# Patient Record
Sex: FEMALE | HISPANIC OR LATINO | Employment: FULL TIME | URBAN - METROPOLITAN AREA
[De-identification: names, ages, dates, MRNs, and addresses within clinical notes are randomized per-mention and may not be internally consistent; named-entity substitution may affect disease eponyms.]

---

## 2017-02-06 ENCOUNTER — PATIENT MESSAGE (OUTPATIENT)
Dept: MEDICAL GROUP | Facility: CLINIC | Age: 37
End: 2017-02-06

## 2017-02-06 ENCOUNTER — HOSPITAL ENCOUNTER (OUTPATIENT)
Facility: MEDICAL CENTER | Age: 37
End: 2017-02-06
Attending: NURSE PRACTITIONER
Payer: COMMERCIAL

## 2017-02-06 ENCOUNTER — OFFICE VISIT (OUTPATIENT)
Dept: MEDICAL GROUP | Facility: CLINIC | Age: 37
End: 2017-02-06
Payer: COMMERCIAL

## 2017-02-06 ENCOUNTER — HOSPITAL ENCOUNTER (OUTPATIENT)
Dept: LAB | Facility: MEDICAL CENTER | Age: 37
End: 2017-02-06
Attending: NURSE PRACTITIONER
Payer: COMMERCIAL

## 2017-02-06 VITALS
WEIGHT: 146 LBS | TEMPERATURE: 99.5 F | HEIGHT: 67 IN | BODY MASS INDEX: 22.91 KG/M2 | HEART RATE: 68 BPM | RESPIRATION RATE: 16 BRPM | DIASTOLIC BLOOD PRESSURE: 62 MMHG | OXYGEN SATURATION: 98 % | SYSTOLIC BLOOD PRESSURE: 102 MMHG

## 2017-02-06 DIAGNOSIS — R10.2 PELVIC PAIN: ICD-10-CM

## 2017-02-06 DIAGNOSIS — F43.9 STRESS AT HOME: ICD-10-CM

## 2017-02-06 DIAGNOSIS — R51.9 NONINTRACTABLE HEADACHE, UNSPECIFIED CHRONICITY PATTERN, UNSPECIFIED HEADACHE TYPE: ICD-10-CM

## 2017-02-06 DIAGNOSIS — R61 DIAPHORESIS: ICD-10-CM

## 2017-02-06 DIAGNOSIS — R53.82 CHRONIC FATIGUE: ICD-10-CM

## 2017-02-06 DIAGNOSIS — R53.83 FATIGUE, UNSPECIFIED TYPE: ICD-10-CM

## 2017-02-06 DIAGNOSIS — N95.1 MENOPAUSAL SYMPTOMS: ICD-10-CM

## 2017-02-06 LAB
APPEARANCE UR: NEGATIVE
BILIRUB UR STRIP-MCNC: NEGATIVE MG/DL
COLOR UR AUTO: NEGATIVE
GLUCOSE UR STRIP.AUTO-MCNC: NEGATIVE MG/DL
INT CON NEG: NEGATIVE
INT CON POS: POSITIVE
KETONES UR STRIP.AUTO-MCNC: NEGATIVE MG/DL
LEUKOCYTE ESTERASE UR QL STRIP.AUTO: NEGATIVE
NITRITE UR QL STRIP.AUTO: NEGATIVE
PH UR STRIP.AUTO: 8 [PH] (ref 5–8)
POC URINE PREGNANCY TEST: NEGATIVE
PROT UR QL STRIP: NEGATIVE MG/DL
RBC UR QL AUTO: NEGATIVE
SP GR UR STRIP.AUTO: 1
TSH SERPL DL<=0.005 MIU/L-ACNC: 1.07 UIU/ML (ref 0.3–3.7)
UROBILINOGEN UR STRIP-MCNC: NEGATIVE MG/DL

## 2017-02-06 PROCEDURE — 81002 URINALYSIS NONAUTO W/O SCOPE: CPT | Performed by: NURSE PRACTITIONER

## 2017-02-06 PROCEDURE — 36415 COLL VENOUS BLD VENIPUNCTURE: CPT

## 2017-02-06 PROCEDURE — 99214 OFFICE O/P EST MOD 30 MIN: CPT | Performed by: NURSE PRACTITIONER

## 2017-02-06 PROCEDURE — 84443 ASSAY THYROID STIM HORMONE: CPT

## 2017-02-06 PROCEDURE — 87086 URINE CULTURE/COLONY COUNT: CPT

## 2017-02-06 ASSESSMENT — PAIN SCALES - GENERAL: PAINLEVEL: 6=MODERATE PAIN

## 2017-02-07 NOTE — PROGRESS NOTES
CC: Faint        HPI:     Malaika presents today for the followin. Pelvic pain  Patient tells me that today she started having some midline pelvic pain. Was associated with a small amount of brown vaginal discharge. Denies dysuria or any urinary symptoms. Denies risk for pregnancy. She's not sexually active. Is using the NuvaRing. States yesterday after the beginning of pelvic pain she placed her new routinely scheduled NuvaRing in her vagina. Symptoms have not worsened if anything maybe mildly improved. Last menstrual period started  and resolved around on .  Pelvic pain is not associated with any changes in bowels. No increasing gas.    2. Fatigue, unspecified type/Nonintractable headache, unspecified chronicity pattern, unspecified headache type/Stress at home  Patient tells me yesterday she had an overall sensation of fatigue. She had a headache that began at that time. She states it started between the eyes and on the forehead in the midline area-this is improved since yesterday. She's had some associated nausea. No vomiting. Able to eat and hold down fluids. Initially denies any history of migraines however we did discuss that she does have a history of some list in her chart. She tells me doesn't feel like her previous migraines.  She tells me she does feel lightheaded however has not fainted. She states she feels very overwhelmed she is going to some court proceedings and divorce issues. Twins are around one year of age-both segment 6 on an off for over a month. Neither sleeping well. Get her up at night. Patient is tearful talking about stress issues.  She also states that he unknown if this is related to her not she states she wakes up at night with her legs being wet and sweaty.      She did contact her primary care earlier today and has a new set of labs ordered related to this.    Current Outpatient Prescriptions   Medication Sig Dispense Refill   • escitalopram (LEXAPRO) 20 MG tablet Take 1  "Tab by mouth every day. 30 Tab 6   • hydrocodone-ibuprofen (VICOPROFEN) 7.5-200 MG per tablet Take 1 Tab by mouth every 6 hours as needed for Moderate Pain or Severe Pain (thoracic neuritis and thoracic spine pain). 120 Tab 0   • gabapentin (NEURONTIN) 300 MG Cap Take 1 Cap by mouth 3 times a day. 90 Cap 6   • acyclovir (ZOVIRAX) 400 MG tablet Take 1 Tab by mouth 3 times a day. 25 Tab 6   • NUVARING 0.12-0.015 MG/24HR vaginal ring      • Prenatal MV-Min-Fe Fum-FA-DHA (PRENATAL 1 PO) Take 1 Tab by mouth every day.     • ferrous gluconate (FERGON) 324 (38 FE) MG Tab Take 324 mg by mouth every day.     • calcium citrate (CALCITRATE) 950 MG Tab Take 650 mg by mouth every day.       No current facility-administered medications for this visit.     Social History   Substance Use Topics   • Smoking status: Never Smoker    • Smokeless tobacco: Never Used   • Alcohol Use: No     I reviewed patients allergies, problem list and medications today in [facility].    ROS: Any/all pertinent positives listed in the HPI, otherwise all others reviewed are negative today.      /62 mmHg  Pulse 68  Temp(Src) 37.5 °C (99.5 °F)  Resp 16  Ht 1.702 m (5' 7.01\")  Wt 66.225 kg (146 lb)  BMI 22.86 kg/m2  SpO2 98%  LMP 01/28/2017  Breastfeeding? No    Exam:    Gen: Alert and oriented, No apparent distress. WDWN  Psych: A+Ox3, normal affect and mood  Skin: Warm, dry and intact. Good turgor   No rashes in visible areas.  Eye: Conjunctiva clear, lids normal  ENMT: Lips without lesions, good dentition   Oropharynx clear.   Neck: No Lymphadenopathy, Thyromegaly, Bruits.   Trachea midline, no masses  Lungs: Clear to auscultation bilaterally, no rales or rhonchi   Unlabored respiratory effort.   CV: Regular rate and rhythm, S1, S2. No murmurs.   No Edema  No CVA tenderness  Ext: No clubbing, cyanosis, edema.   Neuro:    CNs: II:PERRLA, III/IV/VI EOM without ptosis or nystagmus, V motor intact, VII facial movements without asymmetry or weakness, " iX/X palate midline, XI Neck strength intact, XII tongue protuded midline.  Motor: Strength noted 5/5 upper and lower bilateral extremities with normal tone.  No noted atrophy or deformity of motion.  Reflexes: Bilaterally symmetrical, relflexes 2+/4 patellar, radial and achilles.  Cerebellar signs: Absent  Tremors : Absent  Office urinalysis negative for leukocytes, nitrates, blood and pregnancy    Assessment and Plan.   37 y.o. female with the following issues.    1. Pelvic pain  Stable. Pelvic ultrasound is ordered however symptoms continued to improve now that she better NuvaRing back in discussion can cancel this. I did send urine cultures to verify no infection  - POCT Urinalysis  - POC URINE PREGNANCY  - URINE CULTURE(NEW); Future  - US-GYN-PELVIS TRANSVAGINAL; Future    2. Fatigue, unspecified type/Nonintractable headache, unspecified chronicity pattern, unspecified headache type/Stress at home  We'll add a thyroid lab. She's influenza negative. Possibly some recent symptoms could be related to a resolving viral syndrome. 2 suspect a lot of could be related high levels of stress and not sleeping well related to dealing with sick 1-year-old twins. We did discuss self-care. She can work on sleeping as much as she can. She has a lot of pending labs currently. We discussed making sure she gets enough fluids, small routine meals. Etc. We discussed her exams normal today  - TSH WITH REFLEX TO FT4; Future  - POCT Influenza A/B

## 2017-02-08 LAB
BACTERIA UR CULT: NORMAL
SIGNIFICANT IND 70042: NORMAL
SOURCE SOURCE: NORMAL

## 2017-02-14 ENCOUNTER — HOSPITAL ENCOUNTER (OUTPATIENT)
Dept: LAB | Facility: MEDICAL CENTER | Age: 37
End: 2017-02-14
Attending: FAMILY MEDICINE
Payer: COMMERCIAL

## 2017-02-14 ENCOUNTER — OFFICE VISIT (OUTPATIENT)
Dept: MEDICAL GROUP | Facility: CLINIC | Age: 37
End: 2017-02-14
Payer: COMMERCIAL

## 2017-02-14 VITALS
TEMPERATURE: 98.2 F | SYSTOLIC BLOOD PRESSURE: 120 MMHG | DIASTOLIC BLOOD PRESSURE: 60 MMHG | RESPIRATION RATE: 16 BRPM | BODY MASS INDEX: 23.23 KG/M2 | HEART RATE: 60 BPM | WEIGHT: 148 LBS | HEIGHT: 67 IN | OXYGEN SATURATION: 98 %

## 2017-02-14 DIAGNOSIS — M54.6 THORACIC SPINE PAIN: ICD-10-CM

## 2017-02-14 DIAGNOSIS — R61 DIAPHORESIS: ICD-10-CM

## 2017-02-14 DIAGNOSIS — R53.82 CHRONIC FATIGUE: ICD-10-CM

## 2017-02-14 DIAGNOSIS — N95.1 MENOPAUSAL SYMPTOMS: ICD-10-CM

## 2017-02-14 DIAGNOSIS — M54.14 THORACIC NEURITIS: ICD-10-CM

## 2017-02-14 DIAGNOSIS — G89.4 CHRONIC PAIN SYNDROME: ICD-10-CM

## 2017-02-14 DIAGNOSIS — G43.709 CHRONIC MIGRAINE WITHOUT AURA WITHOUT STATUS MIGRAINOSUS, NOT INTRACTABLE: ICD-10-CM

## 2017-02-14 LAB
ALBUMIN SERPL BCP-MCNC: 3.8 G/DL (ref 3.2–4.9)
ALP SERPL-CCNC: 42 U/L (ref 30–99)
ALT SERPL-CCNC: 18 U/L (ref 2–50)
ANION GAP SERPL CALC-SCNC: 5 MMOL/L (ref 0–11.9)
AST SERPL-CCNC: 16 U/L (ref 12–45)
BASOPHILS # BLD AUTO: 0.05 K/UL (ref 0–0.12)
BASOPHILS NFR BLD AUTO: 0.9 % (ref 0–1.8)
BILIRUB CONJ SERPL-MCNC: <0.1 MG/DL (ref 0.1–0.5)
BILIRUB INDIRECT SERPL-MCNC: NORMAL MG/DL (ref 0–1)
BILIRUB SERPL-MCNC: 0.3 MG/DL (ref 0.1–1.5)
BUN SERPL-MCNC: 16 MG/DL (ref 8–22)
CALCIUM SERPL-MCNC: 8.8 MG/DL (ref 8.5–10.5)
CHLORIDE SERPL-SCNC: 106 MMOL/L (ref 96–112)
CO2 SERPL-SCNC: 25 MMOL/L (ref 20–33)
CREAT SERPL-MCNC: 0.81 MG/DL (ref 0.5–1.4)
EOSINOPHIL # BLD: 0.04 K/UL (ref 0–0.51)
EOSINOPHIL NFR BLD AUTO: 0.7 % (ref 0–6.9)
ERYTHROCYTE [DISTWIDTH] IN BLOOD BY AUTOMATED COUNT: 42.6 FL (ref 35.9–50)
ESTRADIOL SERPL-MCNC: 772 PG/ML
FSH SERPL-ACNC: 1.4 MIU/ML
GLUCOSE SERPL-MCNC: 75 MG/DL (ref 65–99)
HCT VFR BLD AUTO: 40 % (ref 37–47)
HGB BLD-MCNC: 13.4 G/DL (ref 12–16)
IMM GRANULOCYTES # BLD AUTO: 0 K/UL (ref 0–0.11)
IMM GRANULOCYTES NFR BLD AUTO: 0 % (ref 0–0.9)
IRON SATN MFR SERPL: 33 % (ref 15–55)
IRON SERPL-MCNC: 128 UG/DL (ref 40–170)
LH SERPL-ACNC: 1 IU/L
LYMPHOCYTES # BLD: 1.94 K/UL (ref 1–4.8)
LYMPHOCYTES NFR BLD AUTO: 35 % (ref 22–41)
MCH RBC QN AUTO: 31.2 PG (ref 27–33)
MCHC RBC AUTO-ENTMCNC: 33.5 G/DL (ref 33.6–35)
MCV RBC AUTO: 93 FL (ref 81.4–97.8)
MONOCYTES # BLD: 0.34 K/UL (ref 0–0.85)
MONOCYTES NFR BLD AUTO: 6.1 % (ref 0–13.4)
NEUTROPHILS # BLD: 3.17 K/UL (ref 2–7.15)
NEUTROPHILS NFR BLD AUTO: 57.3 % (ref 44–72)
NRBC # BLD AUTO: 0 K/UL
NRBC BLD-RTO: 0 /100 WBC
PLATELET # BLD AUTO: 201 K/UL (ref 164–446)
PMV BLD AUTO: 11 FL (ref 9–12.9)
POTASSIUM SERPL-SCNC: 4 MMOL/L (ref 3.6–5.5)
PROT SERPL-MCNC: 6.7 G/DL (ref 6–8.2)
RBC # BLD AUTO: 4.3 M/UL (ref 4.2–5.4)
SODIUM SERPL-SCNC: 136 MMOL/L (ref 135–145)
TIBC SERPL-MCNC: 392 UG/DL (ref 250–450)
VIT B12 SERPL-MCNC: 431 PG/ML (ref 211–911)
WBC # BLD AUTO: 5.5 K/UL (ref 4.8–10.8)

## 2017-02-14 PROCEDURE — 83540 ASSAY OF IRON: CPT

## 2017-02-14 PROCEDURE — 83002 ASSAY OF GONADOTROPIN (LH): CPT

## 2017-02-14 PROCEDURE — 83550 IRON BINDING TEST: CPT

## 2017-02-14 PROCEDURE — 85025 COMPLETE CBC W/AUTO DIFF WBC: CPT

## 2017-02-14 PROCEDURE — 83001 ASSAY OF GONADOTROPIN (FSH): CPT

## 2017-02-14 PROCEDURE — 80076 HEPATIC FUNCTION PANEL: CPT

## 2017-02-14 PROCEDURE — 99213 OFFICE O/P EST LOW 20 MIN: CPT | Performed by: FAMILY MEDICINE

## 2017-02-14 PROCEDURE — 82607 VITAMIN B-12: CPT

## 2017-02-14 PROCEDURE — 82670 ASSAY OF TOTAL ESTRADIOL: CPT

## 2017-02-14 PROCEDURE — 80048 BASIC METABOLIC PNL TOTAL CA: CPT

## 2017-02-14 PROCEDURE — 36415 COLL VENOUS BLD VENIPUNCTURE: CPT

## 2017-02-14 RX ORDER — GABAPENTIN 300 MG/1
300 CAPSULE ORAL 3 TIMES DAILY
Qty: 90 CAP | Refills: 6 | Status: SHIPPED | OUTPATIENT
Start: 2017-02-14 | End: 2017-06-22 | Stop reason: SDUPTHER

## 2017-02-14 RX ORDER — HYDROCODONE BITARTRATE AND IBUPROFEN 7.5; 2 MG/1; MG/1
1 TABLET, FILM COATED ORAL EVERY 6 HOURS PRN
Qty: 120 TAB | Refills: 0 | Status: SHIPPED | OUTPATIENT
Start: 2017-02-14 | End: 2017-02-14 | Stop reason: SDUPTHER

## 2017-02-14 RX ORDER — HYDROCODONE BITARTRATE AND IBUPROFEN 7.5; 2 MG/1; MG/1
1 TABLET, FILM COATED ORAL EVERY 6 HOURS PRN
Qty: 120 TAB | Refills: 0 | Status: SHIPPED | OUTPATIENT
Start: 2017-02-14 | End: 2017-06-13 | Stop reason: SDUPTHER

## 2017-02-14 NOTE — PROGRESS NOTES
CC: Chronic thoracic pain, migraine    HPI:   Malaika presents today with the following.    1. Thoracic spine pain  She has chronic thoracic spine pain with decreased mobility in the thoracic spine and periscapular region. She has worked with PT on this problem in the past. She continues her PT exercises which have been consistently modestly helpful. The pain medication is a combination of hydrocodone and ibuprofen. It is helpful to her bringing her pain from a 7 or 8 down to a 5 or better. This allows her to work and yet her job most of the time. She does have to miss sometimes 1-2 days per month. She tries to Mrs. Hopkins is possible. She uses ice and topical muscle ointments.    2. Thoracic neuritis  The gabapentin has been quite helpful for the neuritis. Unfortunately she is not able to take more than 900 mg total per day due to sedation. Her work demands concentration.  She also does not want to take a great deal and night as she has small children and needs to be able to attend to their needs.    3. Chronic pain syndrome  She has had this pain now for several years. She has been compliant in pursuing imaging and treatment modalities as suggested. No aberrant behavior with the medications or addictive behavior has been observed.    4. Chronic migraine without aura without status migrainosus, not intractable  She does have intermittent migraine. This has been a little less frequent recently even though she is very tired.  The Vicoprofen remains helpful for rescue. She would like to pursue Botox again but the co-pays are too high and she does not have the extra time while her children are this young.      Patient Active Problem List    Diagnosis Date Noted   • Situational mixed anxiety and depressive disorder 12/13/2016   • Chronic use of opiate for therapeutic purpose 05/12/2016   • Chronic migraine without aura without status migrainosus, not intractable 10/29/2015   • Varicose veins of both legs with pain  "01/02/2015   • Chronic pain syndrome 10/21/2014   • Dyslipidemia, goal LDL below 160    • Thoracic neuritis 08/13/2010   • Thoracic spine pain 05/29/2009       Current Outpatient Prescriptions   Medication Sig Dispense Refill   • gabapentin (NEURONTIN) 300 MG Cap Take 1 Cap by mouth 3 times a day. 90 Cap 6   • hydrocodone-ibuprofen (VICOPROFEN) 7.5-200 MG per tablet Take 1 Tab by mouth every 6 hours as needed for Moderate Pain or Severe Pain (thoracic neuritis and thoracic spine pain). 120 Tab 0   • escitalopram (LEXAPRO) 20 MG tablet Take 1 Tab by mouth every day. 30 Tab 6   • acyclovir (ZOVIRAX) 400 MG tablet Take 1 Tab by mouth 3 times a day. 25 Tab 6   • NUVARING 0.12-0.015 MG/24HR vaginal ring      • Prenatal MV-Min-Fe Fum-FA-DHA (PRENATAL 1 PO) Take 1 Tab by mouth every day.     • ferrous gluconate (FERGON) 324 (38 FE) MG Tab Take 324 mg by mouth every day.     • calcium citrate (CALCITRATE) 950 MG Tab Take 650 mg by mouth every day.       No current facility-administered medications for this visit.         Allergies as of 02/14/2017 - Kaveh as Reviewed 02/14/2017   Allergen Reaction Noted   • Tape  03/15/2016        ROS: As per HPI.    /60 mmHg  Pulse 60  Temp(Src) 36.8 °C (98.2 °F)  Resp 16  Ht 1.702 m (5' 7.01\")  Wt 67.132 kg (148 lb)  BMI 23.17 kg/m2  SpO2 98%  LMP 01/28/2017    Physical Exam:  Gen:         Alert and oriented, No apparent distress. Lucid and fluent. Slightly pale and fatigued appearing.  Neck:       Range of motion is good with posterior cervical spasm. No thyromegaly or neck mass appreciated. No cervical adenopathy appreciated.   Lungs:     Clear to auscultation bilaterally  CV:          Regular rate and rhythm. No murmurs, rubs or gallops.  Back:       There is continued periscapular spasm which is very marked. There is a lack of motion in the upper and mid back. There is tenderness in the midthoracic region particularly in the right thoracic.               Ext:          No " clubbing, cyanosis, edema.    Only her TSH was run.  It is normal. She will go back to the lab and ask for the other tests      Assessment and Plan.   37 y.o. female with the following issues.    1. Thoracic spine pain  The pain medication is helpful and well tolerated and is renewed  - hydrocodone-ibuprofen (VICOPROFEN) 7.5-200 MG per tablet; Take 1 Tab by mouth every 6 hours as needed for Moderate Pain or Severe Pain (thoracic neuritis and thoracic spine pain).  Dispense: 120 Tab; Refill: 0    2. Thoracic neuritis  Gabapentin continues to be helpful. She is limited by the sedation from the medication and has only been able to tolerate 900 mg a day. The hydrocodone continues to be helpful and well-tolerated along with the ibuprofen that is part of this medication.  - gabapentin (NEURONTIN) 300 MG Cap; Take 1 Cap by mouth 3 times a day.  Dispense: 90 Cap; Refill: 6  - hydrocodone-ibuprofen (VICOPROFEN) 7.5-200 MG per tablet; Take 1 Tab by mouth every 6 hours as needed for Moderate Pain or Severe Pain (thoracic neuritis and thoracic spine pain).  Dispense: 120 Tab; Refill: 0    3. Chronic pain syndrome  The medication is helpful and well-tolerated and is renewed. She takes it with food as it has a possibility of creating gastritis.   - hydrocodone-ibuprofen (VICOPROFEN) 7.5-200 MG per tablet; Take 1 Tab by mouth every 6 hours as needed for Moderate Pain or Severe Pain (thoracic neuritis and thoracic spine pain).  Dispense: 120 Tab; Refill: 0    4. Chronic migraine without aura without status migrainosus, not intractable  The medication continues to be helpful for rescue.  She has not been able to pursue the Botox due to co-pay costs. She feels the gabapentin has been somewhat helpful for this.  - hydrocodone-ibuprofen (VICOPROFEN) 7.5-200 MG per tablet; Take 1 Tab by mouth every 6 hours as needed for Moderate Pain or Severe Pain (thoracic neuritis and thoracic spine pain).  Dispense: 120 Tab; Refill: 0        Chronic  pain recheck:   Last dose of controlled substance: This a.m.  Chronic pain treated with vicoprofen taken 4-5 times a day    She  reports that she does not drink alcohol.  She  reports that she does not use illicit drugs.    Consequences of Chronic Opiate therapy:  (5 A's)  Analgesia: Compared to no treatment or prior treatment, pain is currently improved  Activity: not changed  Adverse Events: She denies constipation, dry mouth, itchy skin, nausea and sedation  Aberrant Behaviors: She reports she is taking medication as prescribed and is not veering from agreed treatment regimen. There have been no inappropriate refills or lost/stolen meds reported.   Affect/Mood: Pain is at times impacting patient's mood.  Patient reports situational depression/anxiety.    Nonnarcotic treatments that are being used: Gabapentin and chiropractor .   Treatment goals discussed.    Last order of CONTROLLED SUBSTANCE TREATMENT AGREEMENT was found on 8/17/2016 from Office Visit on 8/17/2016     UDS Summary                URINE DRUG SCREEN Next Due 5/7/2017      Done 5/12/2016 PAIN MANAGEMENT PANEL, SCRN W/ RFLX TO QNT        Most recent UDS reviewed today and is consistent with prescribed medications.    I have reviewed the medical records, the Prescription Monitoring Program and I have determined that controlled substance treatment is medically indicated.    FMLA form completed

## 2017-02-14 NOTE — MR AVS SNAPSHOT
"Malaika Steiner   2017 8:40 AM   Office Visit   MRN: [id number]    Department:  [facility]   Dept Phone:  [phone]    Description:  Female : 1980   Provider:  Mayela Kaufman M.D.           Reason for Visit     Back Pain           Allergies as of 2017     Allergen Noted Reactions    Tape 03/15/2016         You were diagnosed with     Thoracic spine pain   [420466]       Thoracic neuritis   [831118]       Chronic pain syndrome   [338.4.ICD-9-CM]       Chronic migraine without aura without status migrainosus, not intractable   [082876]         Vital Signs     Blood Pressure Pulse Temperature Respirations Height Weight    120/60 mmHg 60 36.8 °C (98.2 °F) 16 1.702 m (5' 7.01\") 67.132 kg (148 lb)    Body Mass Index Oxygen Saturation Last Menstrual Period Smoking Status          23.17 kg/m2 98% 2017 Never Smoker         Basic Information     Date Of Birth Sex Race Ethnicity Preferred Language    1980 Female  or   Origin (M[place]ian,Af[place],Italian,[place], etc) English      Problem List              ICD-10-CM Priority Class Noted - Resolved    Thoracic spine pain M54.6   2009 - Present    Thoracic neuritis M54.14   2010 - Present    Dyslipidemia, goal LDL below 160 E78.5   Unknown - Present    Chronic pain syndrome G89.4   10/21/2014 - Present    Varicose veins of both legs with pain I83.813   2015 - Present    Chronic migraine without aura without status migrainosus, not intractable G43.709   10/29/2015 - Present    Chronic use of opiate for therapeutic purpose Z79.899   2016 - Present    Situational mixed anxiety and depressive disorder F43.23   2016 - Present      Health Maintenance        Date Due Completion Dates    PAP SMEAR 2019 (Prv Comp), 11/3/2011    Override on 2016: Previously completed    IMM DTaP/Tdap/Td Vaccine (2 - Td) 2026            Current Immunizations     Influenza " Vaccine Quad Inj (Pf) 12/13/2016    Tdap Vaccine 5/13/2016      Below and/or attached are the medications your provider expects you to take. Review all of your home medications and newly ordered medications with your provider and/or pharmacist. Follow medication instructions as directed by your provider and/or pharmacist. Please keep your medication list with you and share with your provider. Update the information when medications are discontinued, doses are changed, or new medications (including over-the-counter products) are added; and carry medication information at all times in the event of emergency situations     Allergies:  TAPE - (reactions not documented)               Medications  Valid as of: February 14, 2017 - 10:18 AM    Generic Name Brand Name Tablet Size Instructions for use    Acyclovir (Tab) ZOVIRAX 400 MG Take 1 Tab by mouth 3 times a day.        Calcium Citrate (Tab) CALCITRATE 950 MG Take 650 mg by mouth every day.        Escitalopram Oxalate (Tab) LEXAPRO 20 MG Take 1 Tab by mouth every day.        Etonogestrel-Ethinyl Estradiol (RING) NUVARING 0.12-0.015 MG/24HR         Ferrous Gluconate (Tab) FERGON 324 (38 FE) MG Take 324 mg by mouth every day.        Gabapentin (Cap) NEURONTIN 300 MG Take 1 Cap by mouth 3 times a day.        Hydrocodone-Ibuprofen (Tab) VICOPROFEN 7.5-200 MG Take 1 Tab by mouth every 6 hours as needed for Moderate Pain or Severe Pain (thoracic neuritis and thoracic spine pain).        Prenatal MV-Min-Fe Fum-FA-DHA   Take 1 Tab by mouth every day.        .                 Medicines prescribed today were sent to:     [id number] - [place] - [address] [facility]. [address]    [address] [facility]. [address] [place] [address]    Phone: [phone] Fax: [phone]    Open 24 Hours?: No      Medication refill instructions:       If your prescription bottle indicates you have medication refills left, it is not necessary to call your provider’s office. Please contact your pharmacy and  they will refill your medication.    If your prescription bottle indicates you do not have any refills left, you may request refills at any time through one of the following ways: The online [employer] system (except Urgent Care), by calling your provider’s office, or by asking your pharmacy to contact your provider’s office with a refill request. Medication refills are processed only during regular business hours and may not be available until the next business day. Your provider may request additional information or to have a follow-up visit with you prior to refilling your medication.   *Please Note: Medication refills are assigned a new Rx number when refilled electronically. Your pharmacy may indicate that no refills were authorized even though a new prescription for the same medication is available at the pharmacy. Please request the medicine by name with the pharmacy before contacting your provider for a refill.           [employer] Access Code: Activation code not generated  Current [employer] Status: Active

## 2017-02-15 ENCOUNTER — PATIENT MESSAGE (OUTPATIENT)
Dept: MEDICAL GROUP | Facility: CLINIC | Age: 37
End: 2017-02-15

## 2017-03-08 ENCOUNTER — HOSPITAL ENCOUNTER (OUTPATIENT)
Dept: RADIOLOGY | Facility: MEDICAL CENTER | Age: 37
End: 2017-03-08
Attending: NURSE PRACTITIONER
Payer: COMMERCIAL

## 2017-03-08 ENCOUNTER — TELEPHONE (OUTPATIENT)
Dept: MEDICAL GROUP | Facility: CLINIC | Age: 37
End: 2017-03-08

## 2017-03-08 DIAGNOSIS — R10.2 PELVIC PAIN: ICD-10-CM

## 2017-03-08 DIAGNOSIS — N83.209 HEMORRHAGIC OVARIAN CYST: ICD-10-CM

## 2017-03-08 PROCEDURE — 76830 TRANSVAGINAL US NON-OB: CPT

## 2017-03-09 NOTE — TELEPHONE ENCOUNTER
The patient over the phone in regards to her pelvic ultrasound. Discussed that she has a right ovarian cyst. Discussed I would like to repeat an ultrasound in about 2 months to make sure that it's fully resolved. She's no longer using the NuvaRing per discussion with her and her primary care. Today she complains mostly of some nausea. I discussed IM not sure if this is related to her cyst mostly this would be associated with pain and pressure in the pelvic area.   Discussed that she has any worsening or changing symptoms she should follow-up in the office in the meantime.

## 2017-03-24 ENCOUNTER — PATIENT MESSAGE (OUTPATIENT)
Dept: MEDICAL GROUP | Facility: CLINIC | Age: 37
End: 2017-03-24

## 2017-03-25 NOTE — TELEPHONE ENCOUNTER
From: Malaika Steiner  To: Mayela Kaufman M.D.  Sent: 3/24/2017 4:35 PM PDT  Subject: Procedure Question    Hi Dr. Kaufman my abdomen has been bothering a lot. Is there a possibility of getting the process started quicker for surgery. I'm scared this thing is growing. I still feel awful nauseous and weak. I just want to feel better.   Thank you

## 2017-03-27 ENCOUNTER — PATIENT MESSAGE (OUTPATIENT)
Dept: MEDICAL GROUP | Facility: CLINIC | Age: 37
End: 2017-03-27

## 2017-03-27 NOTE — TELEPHONE ENCOUNTER
From: Malaika Steiner  To: Mayela Kaufman M.D.  Sent: 3/27/2017 8:08 AM PDT  Subject: RE:abdominal discomfort    Good morning Dr. Kaufman,    I have a cyst on my right ovary, it's what I am referring to. Chrystal had said if it didn't do away on it's own that surgery would be the path. I wouldn't mind keeping it in and wait if, I didn't feel as depleted as I do. What are your thoughts.     Hope you had a restful weekend:)  Thank you   ----- Message -----  From: Mayela Kaufman M.D.  Sent: 3/25/2017 1:30 PM PDT  To: Malaika Steiner  Subject: abdominal discomfort    I am very sorry, but I am confused. What surgery are you contemplating?

## 2017-04-25 ENCOUNTER — HOSPITAL ENCOUNTER (OUTPATIENT)
Dept: LAB | Facility: MEDICAL CENTER | Age: 37
End: 2017-04-25
Attending: OBSTETRICS & GYNECOLOGY
Payer: COMMERCIAL

## 2017-04-25 PROCEDURE — 88175 CYTOPATH C/V AUTO FLUID REDO: CPT

## 2017-05-02 LAB
CYTOLOGY REG CYTOL: NORMAL
HPV HR 12 DNA CVX QL NAA+PROBE: NEGATIVE
HPV16 DNA SPEC QL NAA+PROBE: NEGATIVE
HPV18 DNA SPEC QL NAA+PROBE: NEGATIVE
SPECIMEN SOURCE: NORMAL

## 2017-05-09 ENCOUNTER — APPOINTMENT (OUTPATIENT)
Dept: RADIOLOGY | Facility: MEDICAL CENTER | Age: 37
End: 2017-05-09
Attending: NURSE PRACTITIONER
Payer: COMMERCIAL

## 2017-05-25 ENCOUNTER — HOSPITAL ENCOUNTER (OUTPATIENT)
Dept: RADIOLOGY | Facility: MEDICAL CENTER | Age: 37
End: 2017-05-25
Attending: NURSE PRACTITIONER
Payer: COMMERCIAL

## 2017-05-25 DIAGNOSIS — N83.209 HEMORRHAGIC OVARIAN CYST: ICD-10-CM

## 2017-05-25 PROCEDURE — 76830 TRANSVAGINAL US NON-OB: CPT

## 2017-05-26 ENCOUNTER — PATIENT MESSAGE (OUTPATIENT)
Dept: MEDICAL GROUP | Facility: CLINIC | Age: 37
End: 2017-05-26

## 2017-05-26 DIAGNOSIS — N83.209 CYST OF OVARY, UNSPECIFIED LATERALITY: ICD-10-CM

## 2017-05-27 NOTE — TELEPHONE ENCOUNTER
From: Malaika Steiner  To: Mayela Kaufman M.D.  Sent: 5/26/2017 7:26 PM PDT  Subject: Procedure Question    I have a question about US-GYN-PELVIS TRANSVAGINAL resulted on 5/25/17, 7:14 PM.    Hi Dr. Kaufman can you please simply what steps I need to take next... and what it means to have fluid leaking from my left ovarian cyst? Is there any red flags... happy to know the right ovarian cyst is gone.  Have a Wonderful weekend!

## 2017-06-12 ENCOUNTER — PATIENT MESSAGE (OUTPATIENT)
Dept: MEDICAL GROUP | Facility: CLINIC | Age: 37
End: 2017-06-12

## 2017-06-12 DIAGNOSIS — G43.709 CHRONIC MIGRAINE WITHOUT AURA WITHOUT STATUS MIGRAINOSUS, NOT INTRACTABLE: ICD-10-CM

## 2017-06-12 DIAGNOSIS — M54.14 THORACIC NEURITIS: ICD-10-CM

## 2017-06-12 DIAGNOSIS — M54.6 THORACIC SPINE PAIN: ICD-10-CM

## 2017-06-12 DIAGNOSIS — G89.4 CHRONIC PAIN SYNDROME: ICD-10-CM

## 2017-06-13 RX ORDER — HYDROCODONE BITARTRATE AND IBUPROFEN 7.5; 2 MG/1; MG/1
1 TABLET, FILM COATED ORAL EVERY 6 HOURS PRN
Qty: 40 TAB | Refills: 0 | Status: SHIPPED | OUTPATIENT
Start: 2017-06-13 | End: 2017-06-22 | Stop reason: SDUPTHER

## 2017-06-21 ENCOUNTER — TELEPHONE (OUTPATIENT)
Dept: MEDICAL GROUP | Facility: CLINIC | Age: 37
End: 2017-06-21

## 2017-06-21 NOTE — TELEPHONE ENCOUNTER
ESTABLISHED PATIENT PRE-VISIT PLANNING     Note: Patient will not be contacted if there is no indication to call.     1.  Reviewed notes from the last few office visits within the medical group: Yes    2.  If any orders were placed at last visit or intended to be done for this visit (i.e. 6 mos follow-up), do we have Results/Consult Notes?        •  Labs - Labs ordered, completed and results are in chart.       •  Imaging - Imaging was not ordered at last office visit.       •  Referrals - No referrals were ordered at last office visit.    3. Is this appointment scheduled as a Hospital Follow-Up? No    4.  Immunizations were updated in Epic using Web[employer]?: Epic matches Web[employer]       •  [employer] Recommendations: CPOX (Varicella) Hep A, adult      5.  Patient is due for the following Health Maintenance Topics:   Health Maintenance Due   Topic Date Due   • URINE DRUG SCREEN  05/07/2017           6.  Patient was NOT informed to arrive 15 min prior to their scheduled appointment and bring in their medication bottles.

## 2017-06-22 ENCOUNTER — OFFICE VISIT (OUTPATIENT)
Dept: MEDICAL GROUP | Facility: CLINIC | Age: 37
End: 2017-06-22
Payer: COMMERCIAL

## 2017-06-22 VITALS
OXYGEN SATURATION: 98 % | HEART RATE: 68 BPM | RESPIRATION RATE: 14 BRPM | WEIGHT: 150 LBS | BODY MASS INDEX: 23.54 KG/M2 | TEMPERATURE: 99.7 F | SYSTOLIC BLOOD PRESSURE: 98 MMHG | HEIGHT: 67 IN | DIASTOLIC BLOOD PRESSURE: 58 MMHG

## 2017-06-22 DIAGNOSIS — G89.29 CHRONIC PERISCAPULAR PAIN ON BOTH SIDES: ICD-10-CM

## 2017-06-22 DIAGNOSIS — M54.6 THORACIC SPINE PAIN: ICD-10-CM

## 2017-06-22 DIAGNOSIS — M25.512 CHRONIC PERISCAPULAR PAIN ON BOTH SIDES: ICD-10-CM

## 2017-06-22 DIAGNOSIS — G43.709 CHRONIC MIGRAINE WITHOUT AURA WITHOUT STATUS MIGRAINOSUS, NOT INTRACTABLE: ICD-10-CM

## 2017-06-22 DIAGNOSIS — M25.511 CHRONIC PERISCAPULAR PAIN ON BOTH SIDES: ICD-10-CM

## 2017-06-22 DIAGNOSIS — Z79.891 CHRONIC USE OF OPIATE FOR THERAPEUTIC PURPOSE: ICD-10-CM

## 2017-06-22 DIAGNOSIS — J30.1 SEASONAL ALLERGIC RHINITIS DUE TO POLLEN: ICD-10-CM

## 2017-06-22 DIAGNOSIS — F43.23 SITUATIONAL MIXED ANXIETY AND DEPRESSIVE DISORDER: ICD-10-CM

## 2017-06-22 DIAGNOSIS — M54.14 THORACIC NEURITIS: ICD-10-CM

## 2017-06-22 DIAGNOSIS — G89.4 CHRONIC PAIN SYNDROME: ICD-10-CM

## 2017-06-22 PROCEDURE — 99213 OFFICE O/P EST LOW 20 MIN: CPT | Performed by: FAMILY MEDICINE

## 2017-06-22 RX ORDER — BACLOFEN 10 MG/1
10 TABLET ORAL 3 TIMES DAILY
Qty: 90 TAB | Refills: 6 | Status: SHIPPED | OUTPATIENT
Start: 2017-06-22 | End: 2017-12-29 | Stop reason: SDUPTHER

## 2017-06-22 RX ORDER — HYDROCODONE BITARTRATE AND IBUPROFEN 7.5; 2 MG/1; MG/1
1 TABLET, FILM COATED ORAL EVERY 6 HOURS PRN
Qty: 120 TAB | Refills: 0 | Status: SHIPPED | OUTPATIENT
Start: 2017-06-22 | End: 2017-06-22 | Stop reason: SDUPTHER

## 2017-06-22 RX ORDER — GABAPENTIN 300 MG/1
300 CAPSULE ORAL 3 TIMES DAILY
Qty: 90 CAP | Refills: 6 | Status: SHIPPED | OUTPATIENT
Start: 2017-06-22 | End: 2017-12-29 | Stop reason: SDUPTHER

## 2017-06-22 RX ORDER — ESCITALOPRAM OXALATE 20 MG/1
20 TABLET ORAL DAILY
Qty: 30 TAB | Refills: 6 | Status: SHIPPED | OUTPATIENT
Start: 2017-06-22 | End: 2017-10-16

## 2017-06-22 RX ORDER — HYDROCODONE BITARTRATE AND IBUPROFEN 7.5; 2 MG/1; MG/1
1 TABLET, FILM COATED ORAL EVERY 6 HOURS PRN
Qty: 120 TAB | Refills: 0 | Status: SHIPPED | OUTPATIENT
Start: 2017-06-22 | End: 2017-09-11 | Stop reason: SDUPTHER

## 2017-06-22 ASSESSMENT — LIFESTYLE VARIABLES: HISTORY_ALCOHOL_USE: 0

## 2017-06-22 ASSESSMENT — ENCOUNTER SYMPTOMS: DEPRESSION: 0

## 2017-06-22 NOTE — PROGRESS NOTES
CC: Chronic migraine, chronic thoracic pain,    HPI:   Malaika presents today with the following.    1. Chronic migraine without aura without status migrainosus, not intractable  Reports  generalized, occipital, typically begin in the back of the head and then generalized headaches described as: dull, pounding, throbbing, squeezing with nausea, vomiting, photophobia and sonophobia, with radiation, without aura  Present since age mid teens Usual frequency is from 4-6 times per month or more  Timing from onset to full blown 15-20 minutes and lasts up to 2 days.  Headaches are relieved by ibuprofen, hydrocodone and ibuprofen, darkening the room, rest, sleeping, ice  Previous treatment and prevention include: Triptan's which were ineffective. Both verapamil and propranolol were limited by her low blood pressure. Tricyclics did not help.  Known triggers include: sun exposure and stress weather changes, poor sleep.   Current stressors include: work, finance, family. Usually sleeps 4-5 hours per night.   Denies difficulty with speech or swallowing, facial numbness or tingling, focal weakness. Denies sore throat or cough, fever, sinus congestion, ear pain, tooth clenching or grinding.  Family Hx: no known family members with significant headaches  Prior imaging: yes    2. Thoracic spine pain/chronic pain syndrome/thoracic neuritis  She has chronic thoracic spine pain which is mid spine all the way to the neck. There is a component of severe muscle spasm. There is a neuritis component which responds to the gabapentin. The hydrocodone ibuprofen combination brings the pain from a 7 or 8 down to 4 or 5.    3. Chronic use of opiate for therapeutic purpose  No aberrant or addictive use of medications has been observed.  Patient counseled to not add Tylenol to current regimen.  Patient counseled to keep medications locked up or under personal control.    4. Situational mixed anxiety and depressive disorder  Here for: depression,  anxiety disorder.    Current symptoms include: palpitations, shortness of breath, insomnia, racing thoughts, difficulty concentrating,  depressed mood, insomnia, fatigue and difficulty concentrating  Since last OV, symptoms are better  She is taking medicine daily as directed. Side effects: Dry mouth.  Mood currently does not affect: work, relationships, social activities.  Denies agoraphobia, panic attacks, SI/HI. (Denies wishing to be dead, thinking about death, intent to commit suicide, previous suicide attempt)     Review Of Systems  Taking supplements to help mood or symptoms: no  Using alcohol or other substances to help mood or symptoms: no  No polydipsia, polyuria, temperature intolerance, bowel changes  No visual or auditory hallucinations. Denies symptoms of ashwin: grandiosity, euphoria, need for little or no sleep, rapid pressured speech, spending sprees, reckless or risky behavior, hypersexual behavior.   Pertinent  ROS findings as above. All other systems reviewed and are negative.      Patient Active Problem List    Diagnosis Date Noted   • Situational mixed anxiety and depressive disorder 12/13/2016   • Chronic use of opiate for therapeutic purpose 05/12/2016   • Chronic migraine without aura without status migrainosus, not intractable 10/29/2015   • Varicose veins of both legs with pain 01/02/2015   • Chronic pain syndrome 10/21/2014   • Dyslipidemia, goal LDL below 160    • Thoracic neuritis 08/13/2010   • Thoracic spine pain 05/29/2009       Current Outpatient Prescriptions   Medication Sig Dispense Refill   • escitalopram (LEXAPRO) 20 MG tablet Take 1 Tab by mouth every day. 30 Tab 6   • gabapentin (NEURONTIN) 300 MG Cap Take 1 Cap by mouth 3 times a day. 90 Cap 6   • hydrocodone-ibuprofen (VICOPROFEN) 7.5-200 MG per tablet Take 1 Tab by mouth every 6 hours as needed for Moderate Pain or Severe Pain (thoracic neuritis and thoracic spine pain). 120 Tab 0   • acyclovir (ZOVIRAX) 400 MG tablet Take 1  "Tab by mouth 3 times a day. 25 Tab 6   • NUVARING 0.12-0.015 MG/24HR vaginal ring      • Prenatal MV-Min-Fe Fum-FA-DHA (PRENATAL 1 PO) Take 1 Tab by mouth every day.     • ferrous gluconate (FERGON) 324 (38 FE) MG Tab Take 324 mg by mouth every day.     • calcium citrate (CALCITRATE) 950 MG Tab Take 650 mg by mouth every day.       No current facility-administered medications for this visit.         Allergies as of 06/22/2017 - Kaveh as Reviewed 06/22/2017   Allergen Reaction Noted   • Tape  03/15/2016        ROS: As per HPI.    BP 98/58 mmHg  Pulse 68  Temp(Src) 37.6 °C (99.7 °F)  Resp 14  Ht 1.702 m (5' 7.01\")  Wt 68.04 kg (150 lb)  BMI 23.49 kg/m2  SpO2 98%  Breastfeeding? No    Physical Exam:  Gen:         Alert and oriented, No apparent distress.  HEENT:   EOMI, PERRLA. No nystagmus or abnormal eye movements appreciated.  Nasal mucosal erythema with mild edema.  Neck:       Posterior cervical spasm, quite marked. Range of motion continues to be excellent. No JVD appreciated.  Shotty cervical adenopathy.  Lungs:     Clear to auscultation bilaterally, good air movement  CV:          Regular rate and rhythm. No murmurs, rubs or gallops.               Ext:          No clubbing, cyanosis, edema.  Thoracic:   Marked spasm of periscapular muscles    Assessment and Plan.   37 y.o. female with the following issues.    1. Chronic migraine without aura without status migrainosus, not intractable  The medication continues to be helpful for rescue. The posterior neck spasm seems to be a major component of her pain.  The periscapular pain seems to feed into the migraines as well. Have discussed baclofen as well. She has not done well with the triptan's.  - hydrocodone-ibuprofen (VICOPROFEN) 7.5-200 MG per tablet; Take 1 Tab by mouth every 6 hours as needed for Moderate Pain or Severe Pain (thoracic neuritis and thoracic spine pain).  Dispense: 120 Tab; Refill: 0    2. Thoracic spine pain  The medication is helpful and " well tolerated and is renewed. I do feel a large component is her severe muscle spasm. Traditional muscle relaxant that in the past have not been helpful. Discussed trial of baclofen.  - hydrocodone-ibuprofen (VICOPROFEN) 7.5-200 MG per tablet; Take 1 Tab by mouth every 6 hours as needed for Moderate Pain or Severe Pain (thoracic neuritis and thoracic spine pain).  Dispense: 120 Tab; Refill: 0    3. Thoracic neuritis  The gabapentin continues to be quite helpful for the neuritis and is renewed. The hydrocodone is helpful for rescue for pain.  - gabapentin (NEURONTIN) 300 MG Cap; Take 1 Cap by mouth 3 times a day.  Dispense: 90 Cap; Refill: 6  - hydrocodone-ibuprofen (VICOPROFEN) 7.5-200 MG per tablet; Take 1 Tab by mouth every 6 hours as needed for Moderate Pain or Severe Pain (thoracic neuritis and thoracic spine pain).  Dispense: 120 Tab; Refill: 0    4. Chronic pain syndrome  The medication continues to be with helpful and well tolerated and is renewed. No aberrant use or misuse has been noted.  - hydrocodone-ibuprofen (VICOPROFEN) 7.5-200 MG per tablet; Take 1 Tab by mouth every 6 hours as needed for Moderate Pain or Severe Pain (thoracic neuritis and thoracic spine pain).  Dispense: 120 Tab; Refill: 0    5. Chronic use of opiate for therapeutic purpose  UDS is discussed and obtained today  - [facility] PAIN MANAGEMENT SCREEN; Future    6. Chronic periscapular pain on both sides  Trial of baclofen is discussed.  - baclofen (LIORESAL) 10 MG Tab; Take 1 Tab by mouth 3 times a day.  Dispense: 90 Tab; Refill: 6    7. Situational mixed anxiety and depressive disorder  The medication continues to be helpful and well tolerated and is renewed.  - escitalopram (LEXAPRO) 20 MG tablet; Take 1 Tab by mouth every day.  Dispense: 30 Tab; Refill: 6      Chronic pain recheck:   Last dose of controlled substance: This afternoon  Chronic pain treated with hydrocodone ibuprofen combo taken 4 times a day    She  reports that she  does not drink alcohol.  She  reports that she does not use illicit drugs.    Consequences of Chronic Opiate therapy:  (5 A's)  Analgesia: Compared to no treatment or prior treatment, pain is currently improved  Activity: improved  Adverse Events: She denies constipation, dry mouth, itchy skin, nausea and sedation  Aberrant Behaviors: She reports she is taking medication as prescribed and is not veering from agreed treatment regimen. There have been no inappropriate refills or lost/stolen meds reported.   Affect/Mood: Pain is impacting patient's mood.  Patient reports stable and improved depression/anxiety.    Nonnarcotic treatments that are being used: NSAIDs/ALANIZ-2, muscle relaxers and Gabapentin.   Treatment goals discussed.    Opioid Risk Score: 1    Interpretation of Opioid Risk Score   Score 0-3 = Low risk of abuse. Do UDS at least once per year.  Score 4-7 = Moderate risk of abuse. Do UDS 1-4 times per year.  Score 8+ = High risk of abuse. Refer to specialist.    Last order of CONTROLLED SUBSTANCE TREATMENT AGREEMENT was found on 8/17/2016 from Office Visit on 8/17/2016     UDS Summary                URINE DRUG SCREEN Overdue 5/7/2017      Done 5/12/2016 PAIN MANAGEMENT PANEL, SCRN W/ RFLX TO Q[facility]        Most recent UDS reviewed today and is consistent with prescribed medications.    I have reviewed the medical records, the Prescription Monitoring Program and I have determined that controlled substance treatment is medically indicated.

## 2017-06-22 NOTE — MR AVS SNAPSHOT
"        Malaika Hendrixs   2017 10:40 AM   Office Visit   MRN: [id number]    Department:  [facility]   Dept Phone:  [phone]    Description:  Female : 1980   Provider:  Mayela Kaufman M.D.           Reason for Visit     Back Pain     Cough x 2 weeks that keeps her up at night, itching in through, green phlegm      Allergies as of 2017     Allergen Noted Reactions    Tape 03/15/2016         You were diagnosed with     Chronic migraine without aura without status migrainosus, not intractable   [699370]       Thoracic spine pain   [318450]       Thoracic neuritis   [825508]       Chronic pain syndrome   [338.4.ICD-9-CM]       Chronic use of opiate for therapeutic purpose   [3700012]       Chronic periscapular pain on both sides   [0573895]       Situational mixed anxiety and depressive disorder   [424643]       Seasonal allergic rhinitis due to pollen   [7225394]         Vital Signs     Blood Pressure Pulse Temperature Respirations Height Weight    98/58 mmHg 68 37.6 °C (99.7 °F) 14 1.702 m (5' 7.01\") 68.04 kg (150 lb)    Body Mass Index Oxygen Saturation Breastfeeding? Smoking Status          23.49 kg/m2 98% No Never Smoker         Basic Information     Date Of Birth Sex Race Ethnicity Preferred Language    1980 Female  or   Origin (Sinhala,Martiniquais,South Korean,Godwin, etc) English      Problem List              ICD-10-CM Priority Class Noted - Resolved    Thoracic spine pain M54.6   2009 - Present    Thoracic neuritis M54.14   2010 - Present    Dyslipidemia, goal LDL below 160 E78.5   Unknown - Present    Chronic pain syndrome G89.4   10/21/2014 - Present    Varicose veins of both legs with pain I83.813   2015 - Present    Chronic migraine without aura without status migrainosus, not intractable G43.709   10/29/2015 - Present    Chronic use of opiate for therapeutic purpose Z79.891   2016 - Present    Situational mixed anxiety and " depressive disorder F43.23   12/13/2016 - Present      Health Maintenance        Date Due Completion Dates    PAP SMEAR 4/25/2020 4/25/2017, 4/28/2016 (Prv Comp), 11/3/2011    Override on 4/28/2016: Previously completed    IMM DTaP/Tdap/Td Vaccine (2 - Td) 5/13/2026 5/13/2016, 8/25/1987, 12/14/1984, 11/1/1983, 1980            Current Immunizations     DTP 8/25/1987, 12/14/1984, 11/1/1983, 1980    Influenza Vaccine Quad Inj (Pf) 12/13/2016    MMR Vaccine 8/6/1991, 11/1/1983    OPV - Historical Data 7/20/1990, 8/25/1987, 1980    Tdap Vaccine 5/13/2016      Below and/or attached are the medications your provider expects you to take. Review all of your home medications and newly ordered medications with your provider and/or pharmacist. Follow medication instructions as directed by your provider and/or pharmacist. Please keep your medication list with you and share with your provider. Update the information when medications are discontinued, doses are changed, or new medications (including over-the-counter products) are added; and carry medication information at all times in the event of emergency situations     Allergies:  TAPE - (reactions not documented)               Medications  Valid as of: June 22, 2017 -  1:00 PM    Generic Name Brand Name Tablet Size Instructions for use    Acyclovir (Tab) ZOVIRAX 400 MG Take 1 Tab by mouth 3 times a day.        Baclofen (Tab) LIORESAL 10 MG Take 1 Tab by mouth 3 times a day.        Calcium Citrate (Tab) CALCITRATE 950 MG Take 650 mg by mouth every day.        Escitalopram Oxalate (Tab) LEXAPRO 20 MG Take 1 Tab by mouth every day.        Etonogestrel-Ethinyl Estradiol (RING) NUVARING 0.12-0.015 MG/24HR         Ferrous Gluconate (Tab) FERGON 324 (38 FE) MG Take 324 mg by mouth every day.        Gabapentin (Cap) NEURONTIN 300 MG Take 1 Cap by mouth 3 times a day.        Hydrocodone-Ibuprofen (Tab) VICOPROFEN 7.5-200 MG Take 1 Tab by mouth every 6 hours as needed for  Moderate Pain or Severe Pain (thoracic neuritis and thoracic spine pain).        Prenatal MV-Min-Fe Fum-FA-DHA   Take 1 Tab by mouth every day.        .                 Medicines prescribed today were sent to:     [id number] - [place] - [address] [facility]    [address] [facility]. [address] [place] [address]    Phone: [phone] Fax: [phone]    Open 24 Hours?: No      Medication refill instructions:       If your prescription bottle indicates you have medication refills left, it is not necessary to call your provider’s office. Please contact your pharmacy and they will refill your medication.    If your prescription bottle indicates you do not have any refills left, you may request refills at any time through one of the following ways: The online [employer] system (except Urgent Care), by calling your provider’s office, or by asking your pharmacy to contact your provider’s office with a refill request. Medication refills are processed only during regular business hours and may not be available until the next business day. Your provider may request additional information or to have a follow-up visit with you prior to refilling your medication.   *Please Note: Medication refills are assigned a new Rx number when refilled electronically. Your pharmacy may indicate that no refills were authorized even though a new prescription for the same medication is available at the pharmacy. Please request the medicine by name with the pharmacy before contacting your provider for a refill.        Your To Do List     Future Labs/Procedures Complete By "[employer] PAIN MANAGEMENT SCREEN  As directed 6/22/2018    Comments:    Current Meds (name, sig, last dose):   Current outpatient prescriptions:   •  hydrocodone-ibuprofen, 1 Tab, Oral, Q6HRS PRN  •  gabapentin, 300 mg, Oral, TID  •  escitalopram, 20 mg, Oral, DAILY  •  acyclovir, 400 mg, Oral, TID  •  NUVARING,   •  Prenatal MV-Min-Fe Fum-FA-DHA (PRENATAL 1 PO), 1  Tab, Oral, DAILY  •  ferrous gluconate, 324 mg, Oral, DAILY  •  calcium citrate, 650 mg, Oral, DAILY               MyChart Access Code: Activation code not generated  Current MyChart Status: Active

## 2017-07-20 ENCOUNTER — PATIENT MESSAGE (OUTPATIENT)
Dept: MEDICAL GROUP | Facility: CLINIC | Age: 37
End: 2017-07-20

## 2017-07-20 DIAGNOSIS — B37.9 YEAST INFECTION: ICD-10-CM

## 2017-07-20 RX ORDER — FLUCONAZOLE 150 MG/1
150 TABLET ORAL DAILY
Qty: 1 TAB | Refills: 0 | Status: SHIPPED | OUTPATIENT
Start: 2017-07-20 | End: 2017-08-29 | Stop reason: SDUPTHER

## 2017-08-29 DIAGNOSIS — B37.9 YEAST INFECTION: ICD-10-CM

## 2017-08-29 RX ORDER — FLUCONAZOLE 150 MG/1
150 TABLET ORAL DAILY
Qty: 1 TAB | Refills: 0 | Status: SHIPPED | OUTPATIENT
Start: 2017-08-29 | End: 2017-10-16

## 2017-09-11 DIAGNOSIS — M54.14 THORACIC NEURITIS: ICD-10-CM

## 2017-09-11 DIAGNOSIS — G89.4 CHRONIC PAIN SYNDROME: ICD-10-CM

## 2017-09-11 DIAGNOSIS — M54.6 THORACIC SPINE PAIN: ICD-10-CM

## 2017-09-11 DIAGNOSIS — G43.709 CHRONIC MIGRAINE WITHOUT AURA WITHOUT STATUS MIGRAINOSUS, NOT INTRACTABLE: ICD-10-CM

## 2017-09-11 RX ORDER — HYDROCODONE BITARTRATE AND IBUPROFEN 7.5; 2 MG/1; MG/1
1 TABLET, FILM COATED ORAL EVERY 6 HOURS PRN
Qty: 27 TAB | Refills: 0 | Status: SHIPPED | OUTPATIENT
Start: 2017-09-11 | End: 2017-10-06 | Stop reason: SDUPTHER

## 2017-09-11 NOTE — PROGRESS NOTES
Discussed with pharmacist. A partial fill was completed on September 7. She is asking for a fill now which of the balance which would be 27 tablets. I have elected to write a prescription for that balance since it is over the 72 hour window required by law.  In other words if she had come in within 72 hours the pharmacist could have filled the balance but they cannot as it is over that. Please let her know that she should come by and  this prescription for 27 tablets. I would like to see her again towards the end of September or first week of October, which ever will work best for her. She should be sure she schedules that date so that she does not run out of medication.

## 2017-10-06 DIAGNOSIS — M54.6 THORACIC SPINE PAIN: ICD-10-CM

## 2017-10-06 DIAGNOSIS — M54.14 THORACIC NEURITIS: ICD-10-CM

## 2017-10-06 DIAGNOSIS — G43.709 CHRONIC MIGRAINE WITHOUT AURA WITHOUT STATUS MIGRAINOSUS, NOT INTRACTABLE: ICD-10-CM

## 2017-10-06 DIAGNOSIS — G89.4 CHRONIC PAIN SYNDROME: ICD-10-CM

## 2017-10-06 RX ORDER — HYDROCODONE BITARTRATE AND IBUPROFEN 7.5; 2 MG/1; MG/1
1 TABLET, FILM COATED ORAL EVERY 6 HOURS PRN
Qty: 30 TAB | Refills: 0 | Status: SHIPPED | OUTPATIENT
Start: 2017-10-06 | End: 2017-10-16 | Stop reason: SDUPTHER

## 2017-10-06 NOTE — PROGRESS NOTES
Sent a request for Vicoprofen to us. She needs to make an appointment with me within the next 3 weeks. Once she has the appointment scheduled I write until that date. In the future she has to be careful to schedule every 3 months.  Addendum; patient has called back and is scheduled for October 13. Partial prescription is written for the medication until that date.

## 2017-10-16 ENCOUNTER — OFFICE VISIT (OUTPATIENT)
Dept: MEDICAL GROUP | Facility: CLINIC | Age: 37
End: 2017-10-16
Payer: COMMERCIAL

## 2017-10-16 VITALS
SYSTOLIC BLOOD PRESSURE: 120 MMHG | BODY MASS INDEX: 23.23 KG/M2 | RESPIRATION RATE: 16 BRPM | OXYGEN SATURATION: 97 % | DIASTOLIC BLOOD PRESSURE: 80 MMHG | TEMPERATURE: 98.1 F | HEIGHT: 67 IN | HEART RATE: 66 BPM | WEIGHT: 148 LBS

## 2017-10-16 DIAGNOSIS — B00.1 HERPES LABIALIS: ICD-10-CM

## 2017-10-16 DIAGNOSIS — Z28.21 INFLUENZA VACCINE REFUSED: ICD-10-CM

## 2017-10-16 DIAGNOSIS — Z79.891 CHRONIC USE OF OPIATE FOR THERAPEUTIC PURPOSE: ICD-10-CM

## 2017-10-16 DIAGNOSIS — M54.6 THORACIC SPINE PAIN: ICD-10-CM

## 2017-10-16 DIAGNOSIS — M54.14 THORACIC NEURITIS: ICD-10-CM

## 2017-10-16 DIAGNOSIS — G43.709 CHRONIC MIGRAINE WITHOUT AURA WITHOUT STATUS MIGRAINOSUS, NOT INTRACTABLE: ICD-10-CM

## 2017-10-16 PROCEDURE — 99213 OFFICE O/P EST LOW 20 MIN: CPT | Performed by: FAMILY MEDICINE

## 2017-10-16 RX ORDER — HYDROCODONE BITARTRATE AND IBUPROFEN 7.5; 2 MG/1; MG/1
1 TABLET, FILM COATED ORAL EVERY 6 HOURS PRN
Qty: 120 TAB | Refills: 0 | Status: SHIPPED | OUTPATIENT
Start: 2017-10-16 | End: 2017-10-16 | Stop reason: SDUPTHER

## 2017-10-16 RX ORDER — ACYCLOVIR 400 MG/1
400 TABLET ORAL 3 TIMES DAILY
Qty: 25 TAB | Refills: 6 | Status: SHIPPED | OUTPATIENT
Start: 2017-10-16 | End: 2017-10-16 | Stop reason: SDUPTHER

## 2017-10-16 RX ORDER — HYDROCODONE BITARTRATE AND IBUPROFEN 7.5; 2 MG/1; MG/1
1 TABLET, FILM COATED ORAL EVERY 6 HOURS PRN
Qty: 120 TAB | Refills: 0 | Status: SHIPPED | OUTPATIENT
Start: 2017-10-16 | End: 2017-12-29 | Stop reason: SDUPTHER

## 2017-10-16 RX ORDER — ACYCLOVIR 400 MG/1
400 TABLET ORAL 3 TIMES DAILY
Qty: 25 TAB | Refills: 6 | Status: SHIPPED | OUTPATIENT
Start: 2017-10-16 | End: 2018-04-18 | Stop reason: SDUPTHER

## 2017-10-16 ASSESSMENT — PATIENT HEALTH QUESTIONNAIRE - PHQ9: CLINICAL INTERPRETATION OF PHQ2 SCORE: 0

## 2017-10-16 NOTE — PROGRESS NOTES
Chief Complaint   Patient presents with   • Migraine   • Back Pain       Subjective:     HPI:   Malaika Steiner presents today with the followin. Chronic migraine without aura without status migrainosus, not intractable  She continues to have episodic migraine still around 5-8 per month. She finds the Vicoprofen to be very helpful, bringing her pain from flares from a 9 down to around a 5. This allows her to stay at work. She also makes an effort to stay well-hydrated. She also has Coca-Cola and aspirin available which will sometimes stop the migraine. She feels the gabapentin has overall helped reduce the severity and frequency of the migraines. Treatment in the past with neurology was not particularly helpful. It seems that her neck pain and thoracic spine spasm seems to be a trigger for her migraines.    2. Thoracic spine pain  She continues to work on the musculature of her spine trying to be more stable and reduce the pain. She is sometimes able to take only 3 of the Vicoprofen a day rather than 4. She would like to reduce slightly over time if she could but has not been successful so far. Denies somnolence or confusion from the pain regimen. She feels overall the pain regimen keeps her pain between a 45 which allows her to work, take care of her children and complete her ADLs. There is a strong element of spasm in her pain but none of the muscle relaxants we have tried have been helpful. They all have been quite sedating and not tolerable.    3. Thoracic neuritis  The neuritis is helped considerably by the gabapentin.    4. Chronic use of opiate for therapeutic purpose  No aberrant or addictive use of medications has been observed.  Patient counseled to not add Tylenol to current regimen.  Patient counseled to keep medications locked up or under personal control. [facility] board of pharmacy interface is reviewed.  No inconsistencies are found.  Her average MME is 54 calculated, active is 60, max  "active is 60.  This is within CDC guideline for chronic pain prescribing by primary care.  Trouble interfacing today, will get printout.    5. Influenza vaccine refused  discussed    6. Herpes labialis  Needs the acyclovir available for flare ups        Patient Active Problem List    Diagnosis Date Noted   • Influenza vaccine refused 10/16/2017   • Situational mixed anxiety and depressive disorder 12/13/2016   • Chronic use of opiate for therapeutic purpose 05/12/2016   • Chronic migraine without aura without status migrainosus, not intractable 10/29/2015   • Varicose veins of both legs with pain 01/02/2015   • Chronic pain syndrome 10/21/2014   • Dyslipidemia, goal LDL below 160    • Thoracic neuritis 08/13/2010   • Thoracic spine pain 05/29/2009       Current medicines (including changes today)  Current Outpatient Prescriptions   Medication Sig Dispense Refill   • acyclovir (ZOVIRAX) 400 MG tablet Take 1 Tab by mouth 3 times a day. 25 Tab 6   • hydrocodone-ibuprofen (VICOPROFEN) 7.5-200 MG per tablet Take 1 Tab by mouth every 6 hours as needed for Moderate Pain or Severe Pain (thoracic neuritis and thoracic spine pain). 120 Tab 0   • gabapentin (NEURONTIN) 300 MG Cap Take 1 Cap by mouth 3 times a day. 90 Cap 6   • baclofen (LIORESAL) 10 MG Tab Take 1 Tab by mouth 3 times a day. 90 Tab 6   • NUVARING 0.12-0.015 MG/24HR vaginal ring      • Prenatal MV-Min-Fe Fum-FA-DHA (PRENATAL 1 PO) Take 1 Tab by mouth every day.     • ferrous gluconate (FERGON) 324 (38 FE) MG Tab Take 324 mg by mouth every day.     • calcium citrate (CALCITRATE) 950 MG Tab Take 650 mg by mouth every day.       No current facility-administered medications for this visit.        Allergies   Allergen Reactions   • Tape        ROS: As per HPI       Objective:     Blood pressure 120/80, pulse 66, temperature 36.7 °C (98.1 °F), resp. rate 16, height 1.702 m (5' 7\"), weight 67.1 kg (148 lb), SpO2 97 %. Body mass index is 23.18 kg/m².    Physical " Exam:  Constitutional: Well-developed and well-nourished. Not diaphoretic. No distress. Lucid and fluent.  Skin: Skin is warm and dry. No rash noted.  Head: Atraumatic without lesions.  Eyes: Conjunctivae and extraocular motions are normal. Pupils are equal, round, and reactive to light. No scleral icterus.   Ears:  External ears unremarkable. Tympanic membranes clear and intact.  Nose: Nares patent. Mucosa without edema or erythema. No discharge. No facial tenderness.  Mouth/Throat: Tongue normal. Oropharynx is clear and moist. Posterior pharynx without erythema or exudates.  Neck:Moderate posterior neck spasm, trachea midline. No thyromegaly present. No cervical or supraclavicular lymphadenopathy. No JVD or carotid bruits appreciated  Cardiovascular: Regular rate and rhythm.  Normal S1, S2 without murmur appreciated.  Chest: Effort normal. Clear to auscultation throughout. No adventitious sounds. Prominent thoracic spasm bilaterally throughout the mid back and periscapular region.  Abdomen: Soft, non tender, and without distention. Active bowel sounds in all four quadrants. No rebound, guarding, masses or hepatosplenomegaly.  Extremities: No cyanosis, clubbing, erythema, nor edema.   Neurological: Alert and oriented x 3. DTRs 2+/3 and symmetric. No cranial nerve deficit.   Psychiatric:  Behavior, mood, and affect are appropriate.       Assessment and Plan:     37 y.o. female with the following issues:    1. Chronic migraine without aura without status migrainosus, not intractable  hydrocodone-ibuprofen (VICOPROFEN) 7.5-200 MG per tablet    DISCONTINUED: hydrocodone-ibuprofen (VICOPROFEN) 7.5-200 MG per tablet    DISCONTINUED: hydrocodone-ibuprofen (VICOPROFEN) 7.5-200 MG per tablet   2. Thoracic spine pain  hydrocodone-ibuprofen (VICOPROFEN) 7.5-200 MG per tablet    DISCONTINUED: hydrocodone-ibuprofen (VICOPROFEN) 7.5-200 MG per tablet    DISCONTINUED: hydrocodone-ibuprofen (VICOPROFEN) 7.5-200 MG per tablet   3.  Thoracic neuritis  hydrocodone-ibuprofen (VICOPROFEN) 7.5-200 MG per tablet    DISCONTINUED: hydrocodone-ibuprofen (VICOPROFEN) 7.5-200 MG per tablet    DISCONTINUED: hydrocodone-ibuprofen (VICOPROFEN) 7.5-200 MG per tablet   4. Chronic use of opiate for therapeutic purpose  CONTROLLED SUBSTANCE TREATMENT AGREEMENT   5. Influenza vaccine refused     6. Herpes labialis  acyclovir (ZOVIRAX) 400 MG tablet    DISCONTINUED: acyclovir (ZOVIRAX) 400 MG tablet     Chronic pain recheck:   Last dose of controlled substance: this am  Chronic pain treated with hydrocodone/ibuprofen taken 3-4 times a day    She  reports that she does not drink alcohol.  She  reports that she does not use drugs.    Consequences of Chronic Opiate therapy:  (5 A's)  Analgesia: Compared to no treatment or prior treatment, pain is currently improved  Activity: improved  Adverse Events: She denies constipation, itchy skin, nausea and sedation  Aberrant Behaviors: She reports she is taking medication as prescribed and is not veering from agreed treatment regimen. There have been no inappropriate refills or lost/stolen meds reported.   Affect/Mood: Pain is impacting patient's mood.  Patient denies depression/anxiety.    Nonnarcotic treatments that are being used: NSAIDs/ALANIZ-2 and Gabapentin.   Treatment goals discussed.    Opioid Risk Score: 1    Interpretation of Opioid Risk Score   Score 0-3 = Low risk of abuse. Do UDS at least once per year.  Score 4-7 = Moderate risk of abuse. Do UDS 1-4 times per year.  Score 8+ = High risk of abuse. Refer to specialist.    Last order of CONTROLLED SUBSTANCE TREATMENT AGREEMENT was found on 10/16/2017 from Office Visit on 10/16/2017     UDS Summary                URINE DRUG SCREEN Next Due 6/17/2018      Done 6/22/2017 [facility] PAIN MANAGEMENT SCREEN     Patient has more history with this topic...        Most recent UDS reviewed today and is consistent with prescribed medications.    I have reviewed the medical  records, the Prescription Monitoring Program and I have determined that controlled substance treatment is medically indicated.        Followup: Return in about 3 months (around 1/16/2018), or if symptoms worsen or fail to improve.

## 2017-10-17 ENCOUNTER — PATIENT MESSAGE (OUTPATIENT)
Dept: MEDICAL GROUP | Facility: CLINIC | Age: 37
End: 2017-10-17

## 2017-10-17 RX ORDER — FLUCONAZOLE 150 MG/1
150 TABLET ORAL DAILY
Qty: 1 TAB | Refills: 0 | Status: SHIPPED | OUTPATIENT
Start: 2017-10-17 | End: 2017-10-17 | Stop reason: SDUPTHER

## 2017-10-17 RX ORDER — FLUCONAZOLE 150 MG/1
150 TABLET ORAL DAILY
Qty: 1 TAB | Refills: 0 | Status: SHIPPED | OUTPATIENT
Start: 2017-10-17 | End: 2017-12-29

## 2017-11-06 ENCOUNTER — PATIENT MESSAGE (OUTPATIENT)
Dept: MEDICAL GROUP | Facility: CLINIC | Age: 37
End: 2017-11-06

## 2017-11-06 DIAGNOSIS — R27.0 ATAXIA: ICD-10-CM

## 2017-11-06 DIAGNOSIS — R42 VERTIGO: ICD-10-CM

## 2017-11-06 RX ORDER — MECLIZINE HYDROCHLORIDE 25 MG/1
25 TABLET ORAL 3 TIMES DAILY PRN
Qty: 30 TAB | Refills: 0 | Status: SHIPPED | OUTPATIENT
Start: 2017-11-06 | End: 2018-04-18 | Stop reason: SDUPTHER

## 2017-11-27 ENCOUNTER — PATIENT MESSAGE (OUTPATIENT)
Dept: MEDICAL GROUP | Facility: CLINIC | Age: 37
End: 2017-11-27

## 2017-11-27 DIAGNOSIS — R42 VERTIGO: ICD-10-CM

## 2017-11-27 DIAGNOSIS — R42 DIZZINESS AND GIDDINESS: ICD-10-CM

## 2017-11-28 ENCOUNTER — PATIENT MESSAGE (OUTPATIENT)
Dept: MEDICAL GROUP | Facility: CLINIC | Age: 37
End: 2017-11-28

## 2017-11-28 ENCOUNTER — HOSPITAL ENCOUNTER (OUTPATIENT)
Dept: RADIOLOGY | Facility: MEDICAL CENTER | Age: 37
End: 2017-11-28
Attending: FAMILY MEDICINE
Payer: COMMERCIAL

## 2017-11-28 DIAGNOSIS — R27.0 ATAXIA: ICD-10-CM

## 2017-11-28 DIAGNOSIS — R42 VERTIGO: ICD-10-CM

## 2017-11-28 PROCEDURE — 70551 MRI BRAIN STEM W/O DYE: CPT

## 2017-11-28 NOTE — TELEPHONE ENCOUNTER
From: Malaika Steiner  To: Ruel Kaufman M.D.  Sent: 11/28/2017 9:02 AM PST  Subject: Non-Urgent Medical Question    Thank you Dr. Kaufman,  I went in for MRI today at 7am.   I appreciate you  ----- Message -----  From: Ruel Kaufman M.D.  Sent: 11/27/2017 9:24 PM PST  To: Malaika Steiner  Subject: RE: Non-Urgent Medical Question  Your blood test in February showed a totally normal blood sugar. That makes diabetes very unlikely. However, chronic ibuprofen may be causing bleeding in the stomach. i will order another lab test, also test for blood count and iron.    Ruel Kaufman M.D.      ----- Message -----   From: Malaika Steiner   Sent: 11/27/2017 8:44 AM PST   To: Ruel Kaufman M.D.  Subject: Non-Urgent Medical Question    I have been feeling dizzy and nauseous still randomly throughout my daily routine. I feel weak and I'd like to get blood work for done for possible diabetes (my mom suggested) or anything you, may believe is possibly related. I do not know what to make of these symptoms. When I feel the symptoms, I feel dizzy, nauseous, my face goes pale; I hurry to eat something, I've tried both sugar or protein and it seems protein helps more than sugar. I have an MRI scheduled tomorrow 7am. I've taken the medication as prescribed and it's helped little. Thank you Dr. Kaufman. Happy Holidays

## 2017-11-29 NOTE — TELEPHONE ENCOUNTER
From: Malaika Steiner  To: Ruel Kaufman M.D.  Sent: 11/28/2017 9:54 AM PST  Subject: Non-Urgent Medical Question    That is great news!   Do I need to fast before going to do labs. Will the staff at laboratory have the what I will be tested for? Do I need to pick anything up from your office?    Thank you!  ----- Message -----  From: Ruel Kaufman M.D.  Sent: 11/28/2017 9:48 AM PST  To: Malaika Steiner  Subject: RE: Non-Urgent Medical Question  The MRI is normal. They read it right away. I am so relieved. Please get the labs done when you can.    Ruel Kaufman M.D.      ----- Message -----   From: Malaika Steiner   Sent: 11/28/2017 9:02 AM PST   To: Ruel Kaufman M.D.  Subject: Non-Urgent Medical Question    Thank you Dr. Kaufman,  I went in for MRI today at 7am.   I appreciate you  ----- Message -----  From: Ruel Kaufman M.D.  Sent: 11/27/2017 9:24 PM PST  To: Malaika Steiner  Subject: RE: Non-Urgent Medical Question  Your blood test in February showed a totally normal blood sugar. That makes diabetes very unlikely. However, chronic ibuprofen may be causing bleeding in the stomach. i will order another lab test, also test for blood count and iron.    Ruel Kaufman M.D.      ----- Message -----   From: Malaika Steiner   Sent: 11/27/2017 8:44 AM PST   To: Ruel Kaufman M.D.  Subject: Non-Urgent Medical Question    I have been feeling dizzy and nauseous still randomly throughout my daily routine. I feel weak and I'd like to get blood work for done for possible diabetes (my mom suggested) or anything you, may believe is possibly related. I do not know what to make of these symptoms. When I feel the symptoms, I feel dizzy, nauseous, my face goes pale; I hurry to eat something, I've tried both sugar or protein and it seems protein helps more than sugar. I have an MRI scheduled tomorrow 7am. I've taken the medication as prescribed and it's helped little. Thank you Dr. Kaufman. Happy Holidays

## 2017-12-01 ENCOUNTER — PATIENT MESSAGE (OUTPATIENT)
Dept: MEDICAL GROUP | Facility: CLINIC | Age: 37
End: 2017-12-01

## 2017-12-02 NOTE — TELEPHONE ENCOUNTER
From: Malaika Steiner  To: Ruel Kaufman M.D.  Sent: 12/1/2017 1:54 PM PST  Subject: Non-Urgent Medical Question    Hi Dr. Kaufman,   I haven't done lab work yet, I would like to test during the time it's happening or shortly after I've had another episode.  ----- Message -----  From: Ruel Kaufman M.D.  Sent: 11/28/2017 10:21 PM PST  To: Malaika Steiner  Subject: RE: Non-Urgent Medical Question  You do not need to fast. They should be able to take it off the computer.    Ruel Kaufman M.D.      ----- Message -----   From: Malaika Steiner   Sent: 11/28/2017 9:54 AM PST   To: Ruel Kaufman M.D.  Subject: Non-Urgent Medical Question    That is great news!   Do I need to fast before going to do labs. Will the staff at laboratory have the what I will be tested for? Do I need to pick anything up from your office?    Thank you!  ----- Message -----  From: Ruel Kaufman M.D.  Sent: 11/28/2017 9:48 AM PST  To: Malaika Steiner  Subject: RE: Non-Urgent Medical Question  The MRI is normal. They read it right away. I am so relieved. Please get the labs done when you can.    Ruel Kaufman M.D.      ----- Message -----   From: Malaika Steiner   Sent: 11/28/2017 9:02 AM PST   To: Ruel Kaufman M.D.  Subject: Non-Urgent Medical Question    Thank you Dr. Kaufman,  I went in for MRI today at 7am.   I appreciate you  ----- Message -----  From: Ruel Kaufman M.D.  Sent: 11/27/2017 9:24 PM PST  To: Malaika Steiner  Subject: RE: Non-Urgent Medical Question  Your blood test in February showed a totally normal blood sugar. That makes diabetes very unlikely. However, chronic ibuprofen may be causing bleeding in the stomach. i will order another lab test, also test for blood count and iron.    Ruel Kaufman M.D.      ----- Message -----   From: Malaika Steiner   Sent: 11/27/2017 8:44 AM PST   To: Ruel Kaufman M.D.  Subject: Non-Urgent Medical Question    I have been feeling dizzy and nauseous still randomly  throughout my daily routine. I feel weak and I'd like to get blood work for done for possible diabetes (my mom suggested) or anything you, may believe is possibly related. I do not know what to make of these symptoms. When I feel the symptoms, I feel dizzy, nauseous, my face goes pale; I hurry to eat something, I've tried both sugar or protein and it seems protein helps more than sugar. I have an MRI scheduled tomorrow 7am. I've taken the medication as prescribed and it's helped little. Thank you Dr. Kaufman. Happy Holidays

## 2017-12-29 ENCOUNTER — OFFICE VISIT (OUTPATIENT)
Dept: MEDICAL GROUP | Facility: CLINIC | Age: 37
End: 2017-12-29
Payer: COMMERCIAL

## 2017-12-29 ENCOUNTER — PATIENT MESSAGE (OUTPATIENT)
Dept: MEDICAL GROUP | Facility: CLINIC | Age: 37
End: 2017-12-29

## 2017-12-29 VITALS
BODY MASS INDEX: 21.97 KG/M2 | OXYGEN SATURATION: 97 % | HEART RATE: 65 BPM | TEMPERATURE: 98.6 F | HEIGHT: 67 IN | SYSTOLIC BLOOD PRESSURE: 118 MMHG | RESPIRATION RATE: 16 BRPM | DIASTOLIC BLOOD PRESSURE: 70 MMHG | WEIGHT: 140 LBS

## 2017-12-29 DIAGNOSIS — M25.511 CHRONIC PERISCAPULAR PAIN ON BOTH SIDES: ICD-10-CM

## 2017-12-29 DIAGNOSIS — M25.512 CHRONIC PERISCAPULAR PAIN ON BOTH SIDES: ICD-10-CM

## 2017-12-29 DIAGNOSIS — G89.29 CHRONIC PERISCAPULAR PAIN ON BOTH SIDES: ICD-10-CM

## 2017-12-29 DIAGNOSIS — M54.6 THORACIC SPINE PAIN: ICD-10-CM

## 2017-12-29 DIAGNOSIS — Z79.891 CHRONIC USE OF OPIATE FOR THERAPEUTIC PURPOSE: ICD-10-CM

## 2017-12-29 DIAGNOSIS — M54.14 THORACIC NEURITIS: ICD-10-CM

## 2017-12-29 DIAGNOSIS — G43.709 CHRONIC MIGRAINE WITHOUT AURA WITHOUT STATUS MIGRAINOSUS, NOT INTRACTABLE: ICD-10-CM

## 2017-12-29 PROCEDURE — 99213 OFFICE O/P EST LOW 20 MIN: CPT | Performed by: FAMILY MEDICINE

## 2017-12-29 RX ORDER — BACLOFEN 10 MG/1
10 TABLET ORAL 3 TIMES DAILY
Qty: 90 TAB | Refills: 6 | Status: SHIPPED | OUTPATIENT
Start: 2017-12-29 | End: 2018-06-28 | Stop reason: SDUPTHER

## 2017-12-29 RX ORDER — GABAPENTIN 300 MG/1
300 CAPSULE ORAL 3 TIMES DAILY
Qty: 90 CAP | Refills: 6 | Status: SHIPPED | OUTPATIENT
Start: 2017-12-29 | End: 2018-06-28 | Stop reason: SDUPTHER

## 2017-12-29 RX ORDER — HYDROCODONE BITARTRATE AND IBUPROFEN 7.5; 2 MG/1; MG/1
1 TABLET, FILM COATED ORAL EVERY 6 HOURS PRN
Qty: 120 TAB | Refills: 0 | Status: SHIPPED | OUTPATIENT
Start: 2018-03-17 | End: 2018-04-18 | Stop reason: SDUPTHER

## 2017-12-29 RX ORDER — HYDROCODONE BITARTRATE AND IBUPROFEN 7.5; 2 MG/1; MG/1
1 TABLET, FILM COATED ORAL EVERY 6 HOURS PRN
Qty: 120 TAB | Refills: 0 | Status: SHIPPED | OUTPATIENT
Start: 2018-02-15 | End: 2017-12-29 | Stop reason: SDUPTHER

## 2017-12-29 RX ORDER — HYDROCODONE BITARTRATE AND IBUPROFEN 7.5; 2 MG/1; MG/1
1 TABLET, FILM COATED ORAL EVERY 6 HOURS PRN
Qty: 120 TAB | Refills: 0 | Status: SHIPPED | OUTPATIENT
Start: 2018-01-16 | End: 2017-12-29 | Stop reason: SDUPTHER

## 2017-12-29 NOTE — PROGRESS NOTES
Chief Complaint   Patient presents with   • Otalgia     rt ear x 2 days   • Back Pain       Subjective:     HPI:   Malaika Steiner presents today with the followin. Chronic migraine without aura without status migrainosus, not intractable  Reports  occipital headaches described as: dull, pounding with nausea and photophobia, without radiation  Present since age late teens to early 20s Usual frequency is between 3-5 per month  Timing from onset to full blown about 10 minutes and lasts 2-3 hours.  Headaches are relieved by naproxen, hydrocodone and ibuprofen, darkening the room, rest, sleeping  Previous treatment and prevention include: Stretches, injections in the neck, physical therapy.  Trip tans were not effective.  Known triggers include: stress weather changes, poor sleep.   Current stressors include: work, finance, family. Usually sleeps 5-7 hours per night.   Denies difficulty with speech or swallowing, facial numbness or tingling, focal weakness. Denies sore throat or cough, fever, sinus congestion, ear pain, tooth clenching or grinding.  Family Hx: headaches of unknown type in mother  Prior imaging: yes    2. Thoracic neuritis  She has continued thoracic neuritis. The gabapentin is helpful bringing the pain down to around a level VII. States the hydrocodone and ibuprofen combination takes a patent abdominal little further to around 5. She feels the combination of all including using her physical therapy exercises and rolling on muscle stretching apparatus is helpful.    3. Thoracic spine pain  She has pain in the thoracic spine. There are some degenerative changes mid spine that are a pain trigger. She feels the current regimen including the gabapentin and baclofen as well as the physical therapy exercises and the muscle rolling and stretching as well as the hydrocodone keeps everything at around a level V which means that she can work the vast majority of the time.    4. Chronic use of opiate  for therapeutic purpose  No aberrant or addictive use of medications has been observed.  Patient counseled to not add Tylenol to current regimen.  Patient counseled to keep medications locked up or under personal control.    5. Chronic periscapular pain on both sides  The baclofen is helpful for the periscapular pain and muscle spasticity. She feels this has been significantly helpful.    Ear complaint not addressed    Patient Active Problem List    Diagnosis Date Noted   • Vertigo 11/06/2017   • Influenza vaccine refused 10/16/2017   • Situational mixed anxiety and depressive disorder 12/13/2016   • Chronic use of opiate for therapeutic purpose 05/12/2016   • Chronic migraine without aura without status migrainosus, not intractable 10/29/2015   • Varicose veins of both legs with pain 01/02/2015   • Chronic pain syndrome 10/21/2014   • Dyslipidemia, goal LDL below 160    • Thoracic neuritis 08/13/2010   • Thoracic spine pain 05/29/2009       Current medicines (including changes today)  Current Outpatient Prescriptions   Medication Sig Dispense Refill   • [START ON 1/16/2018] hydrocodone-ibuprofen (VICOPROFEN) 7.5-200 MG per tablet Take 1 Tab by mouth every 6 hours as needed for Moderate Pain or Severe Pain (thoracic neuritis and thoracic spine pain) for up to 30 days. 120 Tab 0   • gabapentin (NEURONTIN) 300 MG Cap Take 1 Cap by mouth 3 times a day. 90 Cap 6   • baclofen (LIORESAL) 10 MG Tab Take 1 Tab by mouth 3 times a day. 90 Tab 6   • meclizine (ANTIVERT) 25 MG Tab Take 1 Tab by mouth 3 times a day as needed for Dizziness or Vertigo. 30 Tab 0   • acyclovir (ZOVIRAX) 400 MG tablet Take 1 Tab by mouth 3 times a day. 25 Tab 6   • NUVARING 0.12-0.015 MG/24HR vaginal ring      • Prenatal MV-Min-Fe Fum-FA-DHA (PRENATAL 1 PO) Take 1 Tab by mouth every day.     • calcium citrate (CALCITRATE) 950 MG Tab Take 650 mg by mouth every day.       No current facility-administered medications for this visit.        Allergies  "  Allergen Reactions   • Tape        ROS: As per HPI       Objective:     Blood pressure 118/70, pulse 65, temperature 37 °C (98.6 °F), resp. rate 16, height 1.702 m (5' 7\"), weight 63.5 kg (140 lb), SpO2 97 %. Body mass index is 21.93 kg/m².    Physical Exam:  Constitutional: Well-developed and well-nourished. Not diaphoretic. No distress. Lucid and fluent.  Skin: Skin is warm and dry. No rash noted.  Head: Atraumatic without lesions.  Eyes: Conjunctivae and extraocular motions are normal. Pupils are equal, round, and reactive to light. No scleral icterus.   Ears:  External ears unremarkable.  Neck: Supple, trachea midline. No thyromegaly present. No cervical or supraclavicular lymphadenopathy. No JVD or carotid bruits appreciated  Cardiovascular: Regular rate and rhythm.  Normal S1, S2 without murmur appreciated.  Chest: Effort normal. Clear to auscultation throughout. No adventitious sounds.   Extremities: No cyanosis, clubbing, erythema, nor edema.   Neurological: Alert and oriented x 3.  Psychiatric:  Behavior, mood, and affect are appropriate.       Assessment and Plan:     37 y.o. female with the following issues:    1. Chronic migraine without aura without status migrainosus, not intractable  hydrocodone-ibuprofen (VICOPROFEN) 7.5-200 MG per tablet   2. Thoracic neuritis  hydrocodone-ibuprofen (VICOPROFEN) 7.5-200 MG per tablet    gabapentin (NEURONTIN) 300 MG Cap   3. Thoracic spine pain  hydrocodone-ibuprofen (VICOPROFEN) 7.5-200 MG per tablet   4. Chronic use of opiate for therapeutic purpose  CONSENT FOR OPIATE PRESCRIPTION   5. Chronic periscapular pain on both sides  baclofen (LIORESAL) 10 MG Tab     Chronic pain recheck:   Last dose of controlled substance:This morning  Chronic pain treated with hydrocodone ibuprofen taken 4 times a day    She  reports that she does not drink alcohol.  She  reports that she does not use drugs.    Consequences of Chronic Opiate therapy:  (5 A's)  Analgesia: Compared to " no treatment or prior treatment, pain is currently improved  Activity: improved  Adverse Events: She denies constipation, itchy skin, nausea and sedation  Aberrant Behaviors: She reports she is taking medication as prescribed and is not veering from agreed treatment regimen. There have been no inappropriate refills or lost/stolen meds reported.   Affect/Mood: Pain is impacting patient's mood.  Patient denies depression/anxiety.    Nonnarcotic treatments that are being used: NSAIDs/ALANIZ-2, muscle relaxers and Gabapentin.   Treatment goals discussed.    Opioid Risk Score: 1    Interpretation of Opioid Risk Score   Score 0-3 = Low risk of abuse. Do UDS at least once per year.  Score 4-7 = Moderate risk of abuse. Do UDS 1-4 times per year.  Score 8+ = High risk of abuse. Refer to specialist.    Last order of CONTROLLED SUBSTANCE TREATMENT AGREEMENT was found on 10/16/2017 from Office Visit on 10/16/2017     UDS Summary                URINE DRUG SCREEN Next Due 6/17/2018      Done 6/22/2017 [facility] PAIN MANAGEMENT SCREEN     Patient has more history with this topic...        Most recent UDS reviewed today and is consistent with prescribed medications.    I have reviewed the medical records, the Prescription Monitoring Program and I have determined that controlled substance treatment is medically indicated.        Followup: No Follow-up on file.

## 2018-01-14 ENCOUNTER — PATIENT MESSAGE (OUTPATIENT)
Dept: MEDICAL GROUP | Facility: CLINIC | Age: 38
End: 2018-01-14

## 2018-01-14 DIAGNOSIS — B37.31 YEAST VAGINITIS: ICD-10-CM

## 2018-01-15 RX ORDER — FLUCONAZOLE 150 MG/1
150 TABLET ORAL DAILY
Qty: 1 TAB | Refills: 0 | Status: SHIPPED | OUTPATIENT
Start: 2018-01-15 | End: 2018-04-18

## 2018-04-18 ENCOUNTER — HOSPITAL ENCOUNTER (OUTPATIENT)
Dept: LAB | Facility: MEDICAL CENTER | Age: 38
End: 2018-04-18
Attending: FAMILY MEDICINE
Payer: COMMERCIAL

## 2018-04-18 ENCOUNTER — OFFICE VISIT (OUTPATIENT)
Dept: MEDICAL GROUP | Facility: CLINIC | Age: 38
End: 2018-04-18
Payer: COMMERCIAL

## 2018-04-18 VITALS
TEMPERATURE: 97.9 F | RESPIRATION RATE: 16 BRPM | DIASTOLIC BLOOD PRESSURE: 52 MMHG | BODY MASS INDEX: 23.54 KG/M2 | HEIGHT: 67 IN | HEART RATE: 77 BPM | OXYGEN SATURATION: 99 % | WEIGHT: 150 LBS | SYSTOLIC BLOOD PRESSURE: 98 MMHG

## 2018-04-18 DIAGNOSIS — R42 VERTIGO: ICD-10-CM

## 2018-04-18 DIAGNOSIS — Z00.00 LABORATORY EXAMINATION ORDERED AS PART OF A ROUTINE GENERAL MEDICAL EXAMINATION: ICD-10-CM

## 2018-04-18 DIAGNOSIS — Z00.00 ROUTINE GENERAL MEDICAL EXAMINATION AT A HEALTH CARE FACILITY: ICD-10-CM

## 2018-04-18 DIAGNOSIS — Z87.42 HISTORY OF OVARIAN CYST: ICD-10-CM

## 2018-04-18 DIAGNOSIS — M54.6 THORACIC SPINE PAIN: ICD-10-CM

## 2018-04-18 DIAGNOSIS — B00.1 HERPES LABIALIS: ICD-10-CM

## 2018-04-18 DIAGNOSIS — G43.709 CHRONIC MIGRAINE WITHOUT AURA WITHOUT STATUS MIGRAINOSUS, NOT INTRACTABLE: ICD-10-CM

## 2018-04-18 DIAGNOSIS — R42 DIZZINESS AND GIDDINESS: ICD-10-CM

## 2018-04-18 DIAGNOSIS — R27.0 ATAXIA: ICD-10-CM

## 2018-04-18 DIAGNOSIS — R10.2 PELVIC PAIN IN FEMALE: ICD-10-CM

## 2018-04-18 DIAGNOSIS — M54.14 THORACIC NEURITIS: ICD-10-CM

## 2018-04-18 LAB
ALBUMIN SERPL BCP-MCNC: 4.2 G/DL (ref 3.2–4.9)
ALBUMIN/GLOB SERPL: 1.4 G/DL
ALP SERPL-CCNC: 44 U/L (ref 30–99)
ALT SERPL-CCNC: 14 U/L (ref 2–50)
ANION GAP SERPL CALC-SCNC: 6 MMOL/L (ref 0–11.9)
AST SERPL-CCNC: 15 U/L (ref 12–45)
BILIRUB SERPL-MCNC: 0.5 MG/DL (ref 0.1–1.5)
BUN SERPL-MCNC: 13 MG/DL (ref 8–22)
CALCIUM SERPL-MCNC: 9.3 MG/DL (ref 8.5–10.5)
CHLORIDE SERPL-SCNC: 106 MMOL/L (ref 96–112)
CHOLEST SERPL-MCNC: 267 MG/DL (ref 100–199)
CO2 SERPL-SCNC: 25 MMOL/L (ref 20–33)
CREAT SERPL-MCNC: 0.77 MG/DL (ref 0.5–1.4)
ERYTHROCYTE [DISTWIDTH] IN BLOOD BY AUTOMATED COUNT: 43.4 FL (ref 35.9–50)
GLOBULIN SER CALC-MCNC: 3 G/DL (ref 1.9–3.5)
GLUCOSE SERPL-MCNC: 77 MG/DL (ref 65–99)
HCT VFR BLD AUTO: 43 % (ref 37–47)
HDLC SERPL-MCNC: 73 MG/DL
HGB BLD-MCNC: 14.3 G/DL (ref 12–16)
IRON SATN MFR SERPL: 31 % (ref 15–55)
IRON SERPL-MCNC: 124 UG/DL (ref 40–170)
LDLC SERPL CALC-MCNC: 163 MG/DL
MCH RBC QN AUTO: 31.4 PG (ref 27–33)
MCHC RBC AUTO-ENTMCNC: 33.3 G/DL (ref 33.6–35)
MCV RBC AUTO: 94.3 FL (ref 81.4–97.8)
PLATELET # BLD AUTO: 218 K/UL (ref 164–446)
PMV BLD AUTO: 11.2 FL (ref 9–12.9)
POTASSIUM SERPL-SCNC: 4.3 MMOL/L (ref 3.6–5.5)
PROT SERPL-MCNC: 7.2 G/DL (ref 6–8.2)
RBC # BLD AUTO: 4.56 M/UL (ref 4.2–5.4)
SODIUM SERPL-SCNC: 137 MMOL/L (ref 135–145)
TIBC SERPL-MCNC: 399 UG/DL (ref 250–450)
TRIGL SERPL-MCNC: 157 MG/DL (ref 0–149)
VIT B12 SERPL-MCNC: 436 PG/ML (ref 211–911)
WBC # BLD AUTO: 6 K/UL (ref 4.8–10.8)

## 2018-04-18 PROCEDURE — 36415 COLL VENOUS BLD VENIPUNCTURE: CPT

## 2018-04-18 PROCEDURE — 85027 COMPLETE CBC AUTOMATED: CPT

## 2018-04-18 PROCEDURE — 83540 ASSAY OF IRON: CPT

## 2018-04-18 PROCEDURE — 99395 PREV VISIT EST AGE 18-39: CPT | Performed by: FAMILY MEDICINE

## 2018-04-18 PROCEDURE — 83550 IRON BINDING TEST: CPT

## 2018-04-18 PROCEDURE — 80053 COMPREHEN METABOLIC PANEL: CPT

## 2018-04-18 PROCEDURE — 82607 VITAMIN B-12: CPT

## 2018-04-18 PROCEDURE — 80061 LIPID PANEL: CPT

## 2018-04-18 RX ORDER — MECLIZINE HYDROCHLORIDE 25 MG/1
25 TABLET ORAL 3 TIMES DAILY PRN
Qty: 30 TAB | Refills: 0 | Status: SHIPPED | OUTPATIENT
Start: 2018-04-18 | End: 2019-11-18

## 2018-04-18 RX ORDER — HYDROCODONE BITARTRATE AND IBUPROFEN 7.5; 2 MG/1; MG/1
1 TABLET, FILM COATED ORAL EVERY 6 HOURS PRN
Qty: 120 TAB | Refills: 0 | Status: SHIPPED | OUTPATIENT
Start: 2018-04-18 | End: 2018-04-18 | Stop reason: SDUPTHER

## 2018-04-18 RX ORDER — HYDROCODONE BITARTRATE AND IBUPROFEN 7.5; 2 MG/1; MG/1
1 TABLET, FILM COATED ORAL EVERY 6 HOURS PRN
Qty: 120 TAB | Refills: 0 | Status: SHIPPED | OUTPATIENT
Start: 2018-05-18 | End: 2018-04-18 | Stop reason: SDUPTHER

## 2018-04-18 RX ORDER — ACYCLOVIR 400 MG/1
400 TABLET ORAL 3 TIMES DAILY
Qty: 25 TAB | Refills: 11 | Status: SHIPPED | OUTPATIENT
Start: 2018-04-18 | End: 2018-06-28 | Stop reason: SDUPTHER

## 2018-04-18 RX ORDER — HYDROCODONE BITARTRATE AND IBUPROFEN 7.5; 2 MG/1; MG/1
1 TABLET, FILM COATED ORAL EVERY 6 HOURS PRN
Qty: 120 TAB | Refills: 0 | Status: SHIPPED | OUTPATIENT
Start: 2018-06-17 | End: 2018-06-28 | Stop reason: SDUPTHER

## 2018-04-18 ASSESSMENT — ENCOUNTER SYMPTOMS
SPEECH CHANGE: 0
INSOMNIA: 0
PALPITATIONS: 0
HEADACHES: 0
BRUISES/BLEEDS EASILY: 0
CHILLS: 0
WEIGHT LOSS: 0
MEMORY LOSS: 0
VOMITING: 0
DEPRESSION: 0
TINGLING: 0
SPUTUM PRODUCTION: 0
LOSS OF CONSCIOUSNESS: 0
NECK PAIN: 1
FEVER: 0
SENSORY CHANGE: 0
COUGH: 0
WHEEZING: 0
ORTHOPNEA: 0
DIZZINESS: 0
NAUSEA: 0
BLOOD IN STOOL: 0
BLURRED VISION: 0
ABDOMINAL PAIN: 0
HEARTBURN: 0
BACK PAIN: 1
MYALGIAS: 1
TREMORS: 0
SORE THROAT: 0
DIARRHEA: 0
SEIZURES: 0
SHORTNESS OF BREATH: 0
NERVOUS/ANXIOUS: 0
DOUBLE VISION: 0
HALLUCINATIONS: 0
FOCAL WEAKNESS: 0

## 2018-04-18 ASSESSMENT — LIFESTYLE VARIABLES: SUBSTANCE_ABUSE: 0

## 2018-04-18 ASSESSMENT — PAIN SCALES - GENERAL: PAINLEVEL: 6=MODERATE PAIN

## 2018-04-18 NOTE — PROGRESS NOTES
Subjective:      Malaika Steiner is a 38 y.o. female who presents with Annual Exam        She is here for her general wellness visit.    HPI     has a past medical history of Dyslipidemia, goal LDL below 160 and Thoracic spine pain (2004).   has a past surgical history that includes lesion excision general (5/30/08); tenotomy (5/30/08); and primary c section (3/7/2016).  family history includes Diabetes in her mother; Heart Disease (age of onset: 76) in her maternal grandfather; Hyperlipidemia in her maternal grandfather, maternal grandmother, and mother.   reports that she has never smoked. She has never used smokeless tobacco. She reports that she does not drink alcohol or use drugs.      Current Outpatient Prescriptions:   •  gabapentin (NEURONTIN) 300 MG Cap, Take 1 Cap by mouth 3 times a day., Disp: 90 Cap, Rfl: 6  •  baclofen (LIORESAL) 10 MG Tab, Take 1 Tab by mouth 3 times a day., Disp: 90 Tab, Rfl: 6  •  meclizine (ANTIVERT) 25 MG Tab, Take 1 Tab by mouth 3 times a day as needed for Dizziness or Vertigo., Disp: 30 Tab, Rfl: 0  •  acyclovir (ZOVIRAX) 400 MG tablet, Take 1 Tab by mouth 3 times a day., Disp: 25 Tab, Rfl: 6  •  NUVARING 0.12-0.015 MG/24HR vaginal ring, , Disp: , Rfl:   is allergic to tape.    Review of Systems   Constitutional: Negative for chills, fever, malaise/fatigue and weight loss.   HENT: Negative for congestion, hearing loss and sore throat.    Eyes: Negative for blurred vision and double vision.   Respiratory: Negative for cough, sputum production, shortness of breath and wheezing.    Cardiovascular: Negative for chest pain, palpitations, orthopnea and leg swelling.   Gastrointestinal: Negative for abdominal pain, blood in stool, diarrhea, heartburn, nausea and vomiting.   Genitourinary: Positive for dysuria. Negative for frequency, hematuria and urgency.        Pressure and heaviness and pelvic pain.  Periods are irregular.   Musculoskeletal: Positive for back pain, myalgias and  "neck pain. Negative for joint pain.        Chronic neck and back pain   Skin: Negative for rash.   Neurological: Negative for dizziness, tingling, tremors, sensory change, speech change, focal weakness, seizures, loss of consciousness and headaches.   Endo/Heme/Allergies: Positive for environmental allergies. Does not bruise/bleed easily.   Psychiatric/Behavioral: Negative for depression, hallucinations, memory loss, substance abuse and suicidal ideas. The patient is not nervous/anxious and does not have insomnia.           Objective:     BP (!) 98/52   Pulse 77   Temp 36.6 °C (97.9 °F)   Resp 16   Ht 1.702 m (5' 7\")   Wt 68 kg (150 lb)   LMP 04/10/2018   SpO2 99%   Breastfeeding? No   BMI 23.49 kg/m²      Physical Exam   Constitutional: She is oriented to person, place, and time. She appears well-developed and well-nourished.   HENT:   Head: Normocephalic and atraumatic.   Mouth/Throat: Oropharynx is clear and moist.   Eyes: EOM are normal. Pupils are equal, round, and reactive to light. Left eye exhibits no discharge.   Neck: Normal range of motion. Neck supple. No JVD present. No thyromegaly present.   Cardiovascular: Normal rate, regular rhythm and normal heart sounds.    No murmur heard.  Pulmonary/Chest: Effort normal and breath sounds normal. No respiratory distress. She has no wheezes. She has no rales.   Abdominal: Soft. Bowel sounds are normal. She exhibits no distension and no mass. There is tenderness.   Some bloating and lower abdomen tenderness to palpation, no masses appreciated, no HSM.   Musculoskeletal: Normal range of motion. She exhibits no edema.   Lymphadenopathy:     She has no cervical adenopathy.   Neurological: She is alert and oriented to person, place, and time. Coordination normal.   Skin: Skin is warm and dry. No rash noted. No erythema.   Psychiatric: She has a normal mood and affect. Her behavior is normal.   Vitals reviewed.              Assessment/Plan:     1. Routine " general medical examination at a health care facility  She is medically generally well.    2. Laboratory examination ordered as part of a routine general medical examination  Routine orders discussed and placed  - COMP METABOLIC PANEL; Future  - LIPID PROFILE; Future  - CBC WITHOUT DIFFERENTIAL; Future

## 2018-04-18 NOTE — PROGRESS NOTES
Patient is having moderate pelvic discomfort for the last two weeks.  This feels more intense than the cyst she had a little over a year ago.  It is radiating to her back.  There is bloating, period is off, spotting only for two months, also irregular.      She is seen face to face today and examined as part of her yearly general examination.  She also needs a renewal on the acyclovir, meclizine and brit-profen.  No aberrant or addictive use of medications has been observed. Patient counseled to keep medications locked up or under personal control.  [facility] of pharmacy interface is not meshing well for her, will inquire from pharmacy.  No inconsistencies are found.  Her max active MME is 30.  This is within CDC guideline for chronic pain prescribing by primary care.  Patient has failed PT.  She continues to stretch daily.  She had back injections from physiatry which were ineffective.    Chronic pain recheck:   Last dose of controlled substance: this morning  Chronic pain treated with vicoprofen 7.5/200 taken 4 times a day    She  reports that she does not drink alcohol.  She  reports that she does not use drugs.    Consequences of Chronic Opiate therapy:  (5 A's)  Analgesia: Compared to no treatment or prior treatment, pain is currently improved  Activity: improved  Adverse Events: She denies constipation, itchy skin, nausea and sedation  Aberrant Behaviors: She reports she is taking medication as prescribed and is not veering from agreed treatment regimen. There have been no inappropriate refills or lost/stolen meds reported.   Affect/Mood: Pain is impacting patient's mood.  Patient denies depression/anxiety.    Nonnarcotic treatments that are being used: NSAIDs/ALANIZ-2, muscle relaxers and Gabapentin.   Treatment goals discussed.    Opioid Risk Score: 1    Interpretation of Opioid Risk Score   Score 0-3 = Low risk of abuse. Do UDS at least once per year.  Score 4-7 = Moderate risk of abuse. Do UDS 1-4 times per  year.  Score 8+ = High risk of abuse. Refer to specialist.    Last order of CONTROLLED SUBSTANCE TREATMENT AGREEMENT was found on 10/16/2017 from Office Visit on 10/16/2017     UDS Summary                URINE DRUG SCREEN Next Due 6/17/2018      Done 6/22/2017 [facility] PAIN MANAGEMENT SCREEN     Patient has more history with this topic...        Most recent UDS reviewed today and is consistent with prescribed medications.    I have reviewed the medical records, the Prescription Monitoring Program and I have determined that controlled substance treatment is medically indicated.

## 2018-04-23 ENCOUNTER — HOSPITAL ENCOUNTER (OUTPATIENT)
Dept: RADIOLOGY | Facility: MEDICAL CENTER | Age: 38
End: 2018-04-23
Attending: FAMILY MEDICINE
Payer: COMMERCIAL

## 2018-04-23 DIAGNOSIS — R10.2 PELVIC PAIN IN FEMALE: ICD-10-CM

## 2018-04-23 DIAGNOSIS — Z87.42 HISTORY OF OVARIAN CYST: ICD-10-CM

## 2018-04-23 PROCEDURE — 76830 TRANSVAGINAL US NON-OB: CPT

## 2018-06-12 ENCOUNTER — PATIENT MESSAGE (OUTPATIENT)
Dept: MEDICAL GROUP | Facility: CLINIC | Age: 38
End: 2018-06-12

## 2018-06-12 DIAGNOSIS — M54.50 SEVERE LUMBAR PAIN: ICD-10-CM

## 2018-06-17 ENCOUNTER — HOSPITAL ENCOUNTER (OUTPATIENT)
Dept: RADIOLOGY | Facility: MEDICAL CENTER | Age: 38
End: 2018-06-17
Attending: FAMILY MEDICINE
Payer: COMMERCIAL

## 2018-06-17 DIAGNOSIS — M54.50 SEVERE LUMBAR PAIN: ICD-10-CM

## 2018-06-17 PROCEDURE — 72148 MRI LUMBAR SPINE W/O DYE: CPT

## 2018-06-28 ENCOUNTER — OFFICE VISIT (OUTPATIENT)
Dept: MEDICAL GROUP | Facility: CLINIC | Age: 38
End: 2018-06-28
Payer: COMMERCIAL

## 2018-06-28 VITALS
OXYGEN SATURATION: 98 % | BODY MASS INDEX: 23.39 KG/M2 | WEIGHT: 149 LBS | SYSTOLIC BLOOD PRESSURE: 102 MMHG | DIASTOLIC BLOOD PRESSURE: 68 MMHG | HEIGHT: 67 IN | RESPIRATION RATE: 13 BRPM | HEART RATE: 82 BPM | TEMPERATURE: 98.1 F

## 2018-06-28 DIAGNOSIS — G89.29 CHRONIC PERISCAPULAR PAIN ON BOTH SIDES: ICD-10-CM

## 2018-06-28 DIAGNOSIS — M54.6 THORACIC SPINE PAIN: ICD-10-CM

## 2018-06-28 DIAGNOSIS — B00.1 HERPES LABIALIS: ICD-10-CM

## 2018-06-28 DIAGNOSIS — Z79.891 CHRONIC USE OF OPIATE FOR THERAPEUTIC PURPOSE: ICD-10-CM

## 2018-06-28 DIAGNOSIS — M54.14 THORACIC NEURITIS: ICD-10-CM

## 2018-06-28 DIAGNOSIS — G43.709 CHRONIC MIGRAINE WITHOUT AURA WITHOUT STATUS MIGRAINOSUS, NOT INTRACTABLE: ICD-10-CM

## 2018-06-28 DIAGNOSIS — M25.511 CHRONIC PERISCAPULAR PAIN ON BOTH SIDES: ICD-10-CM

## 2018-06-28 DIAGNOSIS — M25.512 CHRONIC PERISCAPULAR PAIN ON BOTH SIDES: ICD-10-CM

## 2018-06-28 PROCEDURE — 99213 OFFICE O/P EST LOW 20 MIN: CPT | Performed by: FAMILY MEDICINE

## 2018-06-28 RX ORDER — HYDROCODONE BITARTRATE AND IBUPROFEN 7.5; 2 MG/1; MG/1
1 TABLET, FILM COATED ORAL EVERY 4 HOURS PRN
Qty: 180 TAB | Refills: 0 | Status: SHIPPED | OUTPATIENT
Start: 2018-09-18 | End: 2018-09-27 | Stop reason: SDUPTHER

## 2018-06-28 RX ORDER — HYDROCODONE BITARTRATE AND IBUPROFEN 7.5; 2 MG/1; MG/1
1 TABLET, FILM COATED ORAL EVERY 4 HOURS PRN
Qty: 180 TAB | Refills: 0 | Status: SHIPPED | OUTPATIENT
Start: 2018-08-19 | End: 2018-06-28 | Stop reason: SDUPTHER

## 2018-06-28 RX ORDER — ACYCLOVIR 400 MG/1
400 TABLET ORAL 3 TIMES DAILY
Qty: 90 TAB | Refills: 6 | Status: SHIPPED | OUTPATIENT
Start: 2018-06-28 | End: 2019-11-18 | Stop reason: SDUPTHER

## 2018-06-28 RX ORDER — GABAPENTIN 300 MG/1
300 CAPSULE ORAL 3 TIMES DAILY
Qty: 270 CAP | Refills: 3 | Status: SHIPPED | OUTPATIENT
Start: 2018-06-28 | End: 2019-11-18 | Stop reason: SDUPTHER

## 2018-06-28 RX ORDER — HYDROCODONE BITARTRATE AND IBUPROFEN 7.5; 2 MG/1; MG/1
1 TABLET, FILM COATED ORAL EVERY 4 HOURS PRN
Qty: 180 TAB | Refills: 0 | Status: SHIPPED | OUTPATIENT
Start: 2018-07-20 | End: 2018-06-28 | Stop reason: SDUPTHER

## 2018-06-28 RX ORDER — BACLOFEN 10 MG/1
10 TABLET ORAL 3 TIMES DAILY
Qty: 270 TAB | Refills: 3 | Status: SHIPPED | OUTPATIENT
Start: 2018-06-28 | End: 2019-11-18
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
## 2018-07-11 ENCOUNTER — PATIENT MESSAGE (OUTPATIENT)
Dept: MEDICAL GROUP | Facility: CLINIC | Age: 38
End: 2018-07-11

## 2018-07-11 DIAGNOSIS — Z30.44 ENCOUNTER FOR SURVEILLANCE OF VAGINAL RING HORMONAL CONTRACEPTIVE DEVICE: ICD-10-CM

## 2018-07-11 RX ORDER — ETONOGESTREL/ETHINYL ESTRADIOL .12-.015MG
RING, VAGINAL VAGINAL
Qty: 1 EACH | Refills: 11 | Status: SHIPPED | OUTPATIENT
Start: 2018-07-11 | End: 2019-11-18

## 2018-07-19 ENCOUNTER — HOSPITAL ENCOUNTER (OUTPATIENT)
Dept: LAB | Facility: MEDICAL CENTER | Age: 38
End: 2018-07-19
Attending: PHYSICIAN ASSISTANT
Payer: COMMERCIAL

## 2018-07-19 PROCEDURE — 88175 CYTOPATH C/V AUTO FLUID REDO: CPT

## 2018-07-20 LAB — CYTOLOGY REG CYTOL: NORMAL

## 2018-08-29 ENCOUNTER — PATIENT MESSAGE (OUTPATIENT)
Dept: MEDICAL GROUP | Facility: CLINIC | Age: 38
End: 2018-08-29

## 2018-08-29 DIAGNOSIS — F43.23 SITUATIONAL MIXED ANXIETY AND DEPRESSIVE DISORDER: ICD-10-CM

## 2018-08-29 RX ORDER — ESCITALOPRAM OXALATE 20 MG/1
20 TABLET ORAL DAILY
Qty: 30 TAB | Refills: 6 | Status: SHIPPED | OUTPATIENT
Start: 2018-08-29 | End: 2019-11-18

## 2018-09-27 DIAGNOSIS — G43.709 CHRONIC MIGRAINE WITHOUT AURA WITHOUT STATUS MIGRAINOSUS, NOT INTRACTABLE: ICD-10-CM

## 2018-09-27 DIAGNOSIS — M54.14 THORACIC NEURITIS: ICD-10-CM

## 2018-09-27 DIAGNOSIS — M54.6 THORACIC SPINE PAIN: ICD-10-CM

## 2018-09-27 RX ORDER — HYDROCODONE BITARTRATE AND IBUPROFEN 7.5; 2 MG/1; MG/1
1 TABLET, FILM COATED ORAL EVERY 4 HOURS PRN
Qty: 180 TAB | Refills: 0 | Status: SHIPPED | OUTPATIENT
Start: 2018-09-27 | End: 2019-11-18 | Stop reason: SDUPTHER

## 2019-11-18 ENCOUNTER — OFFICE VISIT (OUTPATIENT)
Dept: MEDICAL GROUP | Facility: MEDICAL CENTER | Age: 39
End: 2019-11-18

## 2019-11-18 VITALS
HEIGHT: 66 IN | SYSTOLIC BLOOD PRESSURE: 104 MMHG | DIASTOLIC BLOOD PRESSURE: 60 MMHG | WEIGHT: 156 LBS | BODY MASS INDEX: 25.07 KG/M2 | TEMPERATURE: 98.4 F | HEART RATE: 60 BPM | OXYGEN SATURATION: 98 % | RESPIRATION RATE: 16 BRPM

## 2019-11-18 DIAGNOSIS — Z79.891 CHRONIC USE OF OPIATE FOR THERAPEUTIC PURPOSE: ICD-10-CM

## 2019-11-18 DIAGNOSIS — M54.14 THORACIC NEURITIS: ICD-10-CM

## 2019-11-18 DIAGNOSIS — B00.1 HERPES LABIALIS: ICD-10-CM

## 2019-11-18 DIAGNOSIS — G43.709 CHRONIC MIGRAINE WITHOUT AURA WITHOUT STATUS MIGRAINOSUS, NOT INTRACTABLE: ICD-10-CM

## 2019-11-18 DIAGNOSIS — M54.6 THORACIC SPINE PAIN: ICD-10-CM

## 2019-11-18 PROCEDURE — 99213 OFFICE O/P EST LOW 20 MIN: CPT | Performed by: FAMILY MEDICINE

## 2019-11-18 RX ORDER — HYDROCODONE BITARTRATE AND IBUPROFEN 7.5; 2 MG/1; MG/1
1 TABLET, FILM COATED ORAL EVERY 4 HOURS PRN
Qty: 84 TAB | Refills: 0 | Status: SHIPPED | OUTPATIENT
Start: 2019-11-18 | End: 2019-11-18 | Stop reason: SDUPTHER

## 2019-11-18 RX ORDER — HYDROCODONE BITARTRATE AND IBUPROFEN 7.5; 2 MG/1; MG/1
1 TABLET, FILM COATED ORAL EVERY 4 HOURS PRN
Qty: 84 TAB | Refills: 0 | Status: SHIPPED | OUTPATIENT
Start: 2020-01-02 | End: 2020-03-06 | Stop reason: SDUPTHER

## 2019-11-18 RX ORDER — ACYCLOVIR 400 MG/1
400 TABLET ORAL 3 TIMES DAILY
Qty: 90 TAB | Refills: 6 | Status: SHIPPED | OUTPATIENT
Start: 2019-11-18 | End: 2020-11-25 | Stop reason: SDUPTHER

## 2019-11-18 RX ORDER — GABAPENTIN 100 MG/1
200 CAPSULE ORAL 3 TIMES DAILY
Qty: 180 CAP | Refills: 6 | Status: SHIPPED | OUTPATIENT
Start: 2019-11-18 | End: 2020-11-25 | Stop reason: SDUPTHER

## 2019-11-18 RX ORDER — HYDROCODONE BITARTRATE AND IBUPROFEN 7.5; 2 MG/1; MG/1
1 TABLET, FILM COATED ORAL EVERY 4 HOURS PRN
Qty: 84 TAB | Refills: 0 | Status: SHIPPED | OUTPATIENT
Start: 2019-12-02 | End: 2019-11-18 | Stop reason: SDUPTHER

## 2019-11-18 ASSESSMENT — PATIENT HEALTH QUESTIONNAIRE - PHQ9: CLINICAL INTERPRETATION OF PHQ2 SCORE: 0

## 2019-11-18 NOTE — PROGRESS NOTES
Chief Complaint   Patient presents with   • Back Pain   • Arthritis       Subjective:     HPI:   Malaika Steiner presents today with the followin. Thoracic spine pain  Patient has chronic thoracic spine pain and thoracic neuritis.  She uses Vicoprofen for this problem.  She has been doing so for several years.  She also uses gabapentin.  She has been stable on this regimen.  Her current insurance has limited the amount of pills they will cover monthly.  She is adapting to this change.  Her children are a little larger and no longer need to be lifted quite as much so she feels her current regimen is sometimes adequate.  Patient has been treated by neurology in the past and we have tried various treatments and regimens including physical therapy with no sustained benefit.    2. Thoracic neuritis  Patient uses gabapentin for the thoracic neuritis with some help.  Denies somnolence from the medication or the overall medication regimen.    3. Chronic migraine without aura without status migrainosus, not intractable  Patient does have chronic migraine.  She states the gabapentin seems to been somewhat helpful.  She uses the hydrocodone for rescue as needed.    4. Chronic use of opiate for therapeutic purpose  No aberrant or addictive use of medications has been observed.  No adverse events have been reported.  Patient counseled to not add Tylenol to current regimen.  Patient counseled to keep medications locked up or under personal control.  [facility] board of pharmacy interface is reviewed.  No inconsistencies are found.  The patient's maximum MME is 45.  This is within CDC guideline for chronic pain prescribing by primary care.  - Pain Management Screen; Future  - Basic Metabolic Panel; Future    5. Herpes labialis  Patient is finds the acyclovir tablets to be very helpful.  She continues this treatment as needed when the herpes labialis flares.  - acyclovir (ZOVIRAX) 400 MG tablet; Take 1 Tab by mouth 3 times  "a day.  Dispense: 90 Tab; Refill: 6          Patient Active Problem List    Diagnosis Date Noted   • Vertigo 11/06/2017   • Influenza vaccine refused 10/16/2017   • Situational mixed anxiety and depressive disorder 12/13/2016   • Chronic use of opiate for therapeutic purpose 05/12/2016   • Chronic migraine without aura without status migrainosus, not intractable 10/29/2015   • Varicose veins of both legs with pain 01/02/2015   • Chronic pain syndrome 10/21/2014   • Dyslipidemia, goal LDL below 160    • Thoracic neuritis 08/13/2010   • Thoracic spine pain 05/29/2009       Current medicines (including changes today)  Current Outpatient Medications   Medication Sig Dispense Refill   • [START ON 1/2/2020] hydrocodone-ibuprofen (VICOPROFEN) 7.5-200 MG per tablet Take 1 Tab by mouth every four hours as needed for Moderate Pain or Severe Pain (thoracic neuritis and thoracic spine pain) for up to 14 days. 84 Tab 0   • gabapentin (NEURONTIN) 100 MG Cap Take 2 Caps by mouth 3 times a day. 180 Cap 6   • acyclovir (ZOVIRAX) 400 MG tablet Take 1 Tab by mouth 3 times a day. 90 Tab 6     No current facility-administered medications for this visit.        Allergies   Allergen Reactions   • Tape        ROS: As per HPI       Objective:     /60   Pulse 60   Temp 36.9 °C (98.4 °F)   Resp 16   Ht 1.676 m (5' 6\")   Wt 70.8 kg (156 lb)   SpO2 98%  Body mass index is 25.18 kg/m².    Physical Exam:  Constitutional: Well-developed and well-nourished. Not diaphoretic. No distress. Lucid and fluent.  Skin: Skin is warm and dry. No rash noted.  Head: Atraumatic without lesions.  Eyes: Conjunctivae and extraocular motions are normal. Pupils are equal, round, and reactive to light. No scleral icterus.   Mouth/Throat: Tongue normal. Oropharynx is clear and moist. Posterior pharynx without erythema or exudates.  Neck: Supple, trachea midline. No thyromegaly present. No cervical or supraclavicular lymphadenopathy. No JVD or carotid " bruits appreciated  Cardiovascular: Regular rate and rhythm.  Normal S1, S2 without murmur appreciated.  Chest: Effort normal. Clear to auscultation throughout. No adventitious sounds.   Back: Patient's exam is unchanged.  Moderate perivertebral spasm particularly in the mid thoracic region.  Mild kyphosis.  Extremities: No cyanosis, clubbing, erythema, nor edema.   Neurological: Alert and oriented x 3.  No tremor appreciated.  Psychiatric:  Behavior, mood, and affect are appropriate.       Assessment and Plan:     39 y.o. female with the following issues:    1. Thoracic spine pain  hydrocodone-ibuprofen (VICOPROFEN) 7.5-200 MG per tablet    Pain Management Screen    DISCONTINUED: hydrocodone-ibuprofen (VICOPROFEN) 7.5-200 MG per tablet    DISCONTINUED: hydrocodone-ibuprofen (VICOPROFEN) 7.5-200 MG per tablet   2. Thoracic neuritis  hydrocodone-ibuprofen (VICOPROFEN) 7.5-200 MG per tablet    gabapentin (NEURONTIN) 100 MG Cap    Pain Management Screen    DISCONTINUED: hydrocodone-ibuprofen (VICOPROFEN) 7.5-200 MG per tablet    DISCONTINUED: hydrocodone-ibuprofen (VICOPROFEN) 7.5-200 MG per tablet   3. Chronic migraine without aura without status migrainosus, not intractable  hydrocodone-ibuprofen (VICOPROFEN) 7.5-200 MG per tablet    Pain Management Screen    DISCONTINUED: hydrocodone-ibuprofen (VICOPROFEN) 7.5-200 MG per tablet    DISCONTINUED: hydrocodone-ibuprofen (VICOPROFEN) 7.5-200 MG per tablet   4. Chronic use of opiate for therapeutic purpose  Pain Management Screen    Basic Metabolic Panel    Consent for Opiate Prescription    Controlled Substance Treatment Agreement   5. Herpes labialis  acyclovir (ZOVIRAX) 400 MG tablet     Chronic pain recheck for: Chronic thoracic pain with thoracic neuritis  Last dose of controlled substance: Today  Chronic pain treated with Vicoprofen 7.5/200 taken 3-4 times a day    She  reports no history of alcohol use.  She  reports no history of drug use.    Interval history:  Patient has had increased pain with less pain medication available.  She is continuing her PT exercises which have been somewhat helpful.  The gabapentin is also a crucial part of her regimen.  Any major change in health since last appointment? No    Consequences of Chronic Opiate therapy:  (5 A's)  Analgesia: Compared to no treatment or prior treatment, pain is currently improved  Activity: improved  Adverse Events: She denies constipation, itchy skin, nausea and sedation  Aberrant Behaviors: She reports she is taking medication as prescribed and is not veering from agreed treatment regimen or provider recommendations. There have been no inappropriate refills or lost/stolen meds reported.  Affect/Mood: Pain is sometimes impacting patient's mood.  Patient denies depression/anxiety.    Nonnarcotic treatments that are being used: NSAIDs/ALANIZ-2, Gabapentin, ice, heat and Continues physical therapy exercises.     Last imaging: Lumbar MRI 2018    Opioid Risk Score: 1    Interpretation of Opioid Risk Score   Score 0-3 = Low risk of abuse. Do UDS at least once per year.  Score 4-7 = Moderate risk of abuse. Do UDS 1-4 times per year.  Score 8+ = High risk of abuse. Refer to specialist.    Last order of CONTROLLED SUBSTANCE TREATMENT AGREEMENT was found on 11/18/2019 from Office Visit on 11/18/2019     UDS Summary                URINE DRUG SCREEN Postponed 3/1/2020 Originally 6/17/2018. Insurance/Financial     [facility]     Patient has more history with this topic...        Most recent UDS reviewed today and is consistent with prescribed medications.     I have reviewed the medical records, the Prescription Monitoring Program and I have determined that controlled substance treatment is medically indicated.       Followup: Return in about 3 months (around 2/18/2020), or if symptoms worsen or fail to improve.

## 2020-01-15 LAB
AMPHETAMINES UR QL SCN: NEGATIVE NG/ML
BARBITURATES UR QL SCN: NEGATIVE NG/ML
BENZODIAZ UR QL: NEGATIVE NG/ML
BUN SERPL-MCNC: 11 MG/DL (ref 6–20)
BUN/CREAT SERPL: 14 (ref 9–23)
BZE UR QL: NEGATIVE NG/ML
CALCIUM SERPL-MCNC: 9.6 MG/DL (ref 8.7–10.2)
CANNABINOIDS UR QL SCN: NEGATIVE NG/ML
CHLORIDE SERPL-SCNC: 100 MMOL/L (ref 96–106)
CO2 SERPL-SCNC: 23 MMOL/L (ref 20–29)
CREAT SERPL-MCNC: 0.8 MG/DL (ref 0.57–1)
GLUCOSE SERPL-MCNC: 82 MG/DL (ref 65–99)
METHADONE UR QL SCN: NEGATIVE NG/ML
OPIATES UR QL: NEGATIVE NG/ML
PCP UR QL: NEGATIVE NG/ML
POTASSIUM SERPL-SCNC: 4.1 MMOL/L (ref 3.5–5.2)
PROPOXYPH UR QL SCN: NEGATIVE NG/ML
SODIUM SERPL-SCNC: 138 MMOL/L (ref 134–144)

## 2020-02-11 ENCOUNTER — APPOINTMENT (OUTPATIENT)
Dept: MEDICAL GROUP | Facility: MEDICAL CENTER | Age: 40
End: 2020-02-11

## 2020-03-06 ENCOUNTER — OFFICE VISIT (OUTPATIENT)
Dept: MEDICAL GROUP | Facility: MEDICAL CENTER | Age: 40
End: 2020-03-06

## 2020-03-06 VITALS
BODY MASS INDEX: 24.91 KG/M2 | HEART RATE: 63 BPM | SYSTOLIC BLOOD PRESSURE: 104 MMHG | TEMPERATURE: 98.9 F | RESPIRATION RATE: 16 BRPM | HEIGHT: 66 IN | DIASTOLIC BLOOD PRESSURE: 58 MMHG | WEIGHT: 155 LBS | OXYGEN SATURATION: 98 %

## 2020-03-06 DIAGNOSIS — J01.00 ACUTE NON-RECURRENT MAXILLARY SINUSITIS: ICD-10-CM

## 2020-03-06 DIAGNOSIS — M54.14 THORACIC NEURITIS: ICD-10-CM

## 2020-03-06 DIAGNOSIS — Z79.891 CHRONIC USE OF OPIATE FOR THERAPEUTIC PURPOSE: ICD-10-CM

## 2020-03-06 DIAGNOSIS — M54.6 THORACIC SPINE PAIN: ICD-10-CM

## 2020-03-06 DIAGNOSIS — G43.709 CHRONIC MIGRAINE WITHOUT AURA WITHOUT STATUS MIGRAINOSUS, NOT INTRACTABLE: ICD-10-CM

## 2020-03-06 PROCEDURE — 99213 OFFICE O/P EST LOW 20 MIN: CPT | Performed by: FAMILY MEDICINE

## 2020-03-06 RX ORDER — HYDROCODONE BITARTRATE AND IBUPROFEN 7.5; 2 MG/1; MG/1
1 TABLET, FILM COATED ORAL EVERY 8 HOURS PRN
Qty: 90 TAB | Refills: 0 | Status: SHIPPED | OUTPATIENT
Start: 2020-04-05 | End: 2020-03-06 | Stop reason: SDUPTHER

## 2020-03-06 RX ORDER — HYDROCODONE BITARTRATE AND IBUPROFEN 7.5; 2 MG/1; MG/1
1 TABLET, FILM COATED ORAL EVERY 8 HOURS PRN
Qty: 90 TAB | Refills: 0 | Status: SHIPPED | OUTPATIENT
Start: 2020-05-05 | End: 2020-11-25 | Stop reason: SDUPTHER

## 2020-03-06 RX ORDER — HYDROCODONE BITARTRATE AND IBUPROFEN 7.5; 2 MG/1; MG/1
1 TABLET, FILM COATED ORAL EVERY 8 HOURS PRN
Qty: 90 TAB | Refills: 0 | Status: SHIPPED | OUTPATIENT
Start: 2020-03-06 | End: 2020-03-06 | Stop reason: SDUPTHER

## 2020-03-06 RX ORDER — HYDROCODONE BITARTRATE AND IBUPROFEN 7.5; 2 MG/1; MG/1
TABLET, FILM COATED ORAL
COMMUNITY
Start: 2020-01-22 | End: 2020-03-06

## 2020-03-06 RX ORDER — AZITHROMYCIN 250 MG/1
TABLET, FILM COATED ORAL
Qty: 6 TAB | Refills: 0 | Status: SHIPPED | OUTPATIENT
Start: 2020-03-06 | End: 2020-11-25

## 2020-03-06 ASSESSMENT — FIBROSIS 4 INDEX: FIB4 SCORE: 0.7

## 2020-03-07 NOTE — PROGRESS NOTES
Chief Complaint   Patient presents with   • Cough   • Back Pain       Subjective:     HPI:   Malaika Steiner presents today with the followin. Thoracic spine pain  Patient has chronic thoracic spine pain and thoracic neuritis.  She uses Vicoprofen for this problem.  She has been doing so for several years.  She also uses gabapentin.  She has been stable on this regimen.  She has adapted well to a reduction of pain medication to 84 every 14 days.  She has been making this last a full month.  Discussed re-writing her prescriptions to reflect that.   She feels that regimen is now adequate. Patient has been treated by neurology in the past and we have tried various treatments and regimens including physical therapy with no sustained benefit.  The combination anti-inflammatory and narcotic seems most helpful to her.  Denies somnolence or confusion from the regimen.  This is renewed.    2. Thoracic neuritis  Patient states the gabapentin continues to be helpful.  She has renewals available at this time.  States that at this dose she is not too somnolent.  At higher doses she becomes sleepy and ataxic and does not like that.    3. Chronic migraine without aura without status migrainosus, not intractable  Patient is still having episodic migraine.  She states these have been less frequent but still quite severe at times.  She uses the hydrocodone ibuprofen combination for rescue.  She states this is helpful.    4. Chronic use of opiate for therapeutic purpose  No aberrant or addictive use of medications has been observed.  No adverse events have been reported.  Patient counseled to not add Tylenol to current regimen.  Patient counseled to keep medications locked up or under personal control.  [facility] board of pharmacy interface is reviewed.  No inconsistencies are found.  The patient's maximum MME is 22.5.  This is within CDC guideline for chronic pain prescribing by primary care.    5. Acute non-recurrent  "maxillary sinusitis  Patient has had 5 days of cough.  Her boys have been ill and were prescribed antibiotics by the pediatrician.  Patient notes some eye mattering but in particular she notes maxillary pressure.  On examination she has significant streaking and edema of the posterior pharynx with preserved airway.  Discussed pros and cons of antibiotics.  I have gone ahead and prescribed azithromycin.  I have asked her to stay home as much as possible the next few days.  She acknowledges.        Patient Active Problem List    Diagnosis Date Noted   • Vertigo 11/06/2017   • Influenza vaccine refused 10/16/2017   • Situational mixed anxiety and depressive disorder 12/13/2016   • Chronic use of opiate for therapeutic purpose 05/12/2016   • Chronic migraine without aura without status migrainosus, not intractable 10/29/2015   • Varicose veins of both legs with pain 01/02/2015   • Chronic pain syndrome 10/21/2014   • Dyslipidemia, goal LDL below 160    • Thoracic neuritis 08/13/2010   • Thoracic spine pain 05/29/2009       Current medicines (including changes today)  Current Outpatient Medications   Medication Sig Dispense Refill   • [START ON 5/5/2020] hydrocodone-ibuprofen (VICOPROFEN) 7.5-200 MG per tablet Take 1 Tab by mouth every 8 hours as needed for Moderate Pain or Severe Pain (thoracic neuritis and thoracic spine pain) for up to 30 days. 90 Tab 0   • azithromycin (ZITHROMAX) 250 MG Tab As directed on pack 6 Tab 0   • gabapentin (NEURONTIN) 100 MG Cap Take 2 Caps by mouth 3 times a day. 180 Cap 6   • acyclovir (ZOVIRAX) 400 MG tablet Take 1 Tab by mouth 3 times a day. 90 Tab 6     No current facility-administered medications for this visit.        Allergies   Allergen Reactions   • Tape        ROS: As per HPI       Objective:     /58 (BP Location: Right arm, Patient Position: Sitting)   Pulse 63   Temp 37.2 °C (98.9 °F) (Temporal)   Resp 16   Ht 1.676 m (5' 6\")   Wt 70.3 kg (155 lb)   SpO2 98%  Body " mass index is 25.02 kg/m².    Physical Exam:  Constitutional: Well-developed and well-nourished. Not diaphoretic. No distress. Lucid and fluent.  Skin: Skin is warm and dry. No rash noted.  Head: Atraumatic without lesions.  Eyes: Conjunctivae and extraocular motions are normal. Pupils are equal, round, and reactive to light. No scleral icterus.   Nose: Nares patent. Mucosa with mild edema and erythema.  Clear discharge.  Bilateral maxillary facial tenderness.  Mouth/Throat: Tongue normal. Oropharynx is moist. Posterior pharynx with significant streaky erythema and a ribbon of green discolored phlegm.  No exudates appreciated.  Patient does not have tonsillar enlargement.  The airway is patent.   Neck: Supple, trachea midline. No thyromegaly present. No cervical or supraclavicular lymphadenopathy. No JVD or carotid bruits appreciated  Cardiovascular: Regular rate and rhythm.  Normal S1, S2 without murmur appreciated.  Chest: Effort normal. Clear to auscultation throughout. No adventitious sounds.  Good air movement.  Back: Continued moderate curvature of the thoracic spine with very marked perivertebral spasm.  There is moderate spinous process tenderness.  Extremities: No cyanosis, clubbing, erythema, nor edema.   Neurological: Alert and oriented x 3.  No tremor appreciated.  Psychiatric:  Behavior, mood, and affect are appropriate.       Assessment and Plan:     40 y.o. female with the following issues:    1. Thoracic spine pain  hydrocodone-ibuprofen (VICOPROFEN) 7.5-200 MG per tablet    DISCONTINUED: hydrocodone-ibuprofen (VICOPROFEN) 7.5-200 MG per tablet    DISCONTINUED: hydrocodone-ibuprofen (VICOPROFEN) 7.5-200 MG per tablet    DISCONTINUED: hydrocodone-ibuprofen (VICOPROFEN) 7.5-200 MG per tablet   2. Thoracic neuritis  hydrocodone-ibuprofen (VICOPROFEN) 7.5-200 MG per tablet    DISCONTINUED: hydrocodone-ibuprofen (VICOPROFEN) 7.5-200 MG per tablet    DISCONTINUED: hydrocodone-ibuprofen (VICOPROFEN) 7.5-200  MG per tablet    DISCONTINUED: hydrocodone-ibuprofen (VICOPROFEN) 7.5-200 MG per tablet   3. Chronic migraine without aura without status migrainosus, not intractable  hydrocodone-ibuprofen (VICOPROFEN) 7.5-200 MG per tablet    DISCONTINUED: hydrocodone-ibuprofen (VICOPROFEN) 7.5-200 MG per tablet    DISCONTINUED: hydrocodone-ibuprofen (VICOPROFEN) 7.5-200 MG per tablet    DISCONTINUED: hydrocodone-ibuprofen (VICOPROFEN) 7.5-200 MG per tablet   4. Chronic use of opiate for therapeutic purpose     5. Acute non-recurrent maxillary sinusitis  azithromycin (ZITHROMAX) 250 MG Tab     Chronic pain recheck for: Chronic back pain, primarily thoracic.  Chronic thoracic neuritis.  Chronic migraine.  Last dose of controlled substance: Today  Chronic pain treated with hydrocodone/ibuprofen taken 3 times a day    She  reports no history of alcohol use.  She  reports no history of drug use.    Interval history: Patient feels she has been doing better overall.  With the children being larger and more mobile there has not been as much back problem.  She has adapted to the lower amount of medication and feels this is quite adequate.  Any major change in health since last appointment? No    Consequences of Chronic Opiate therapy:  (5 A's)  Analgesia: Compared to no treatment or prior treatment, pain is currently improved  Activity: improved  Adverse Events: She denies constipation, itchy skin, nausea and sedation  Aberrant Behaviors: She reports she is taking medication as prescribed and is not veering from agreed treatment regimen or provider recommendations. There have been no inappropriate refills or lost/stolen meds reported.  Affect/Mood: Pain is only sometimes impacting patient's mood.  Patient denies depression/anxiety.    Nonnarcotic treatments that are being used: NSAIDs/ALANIZ-2, Gabapentin, chiropractor , ice and heat.     Last imaging: MRI 2016    Opioid Risk Score: 1    Interpretation of Opioid Risk Score   Score 0-3 = Low  risk of abuse. Do UDS at least once per year.  Score 4-7 = Moderate risk of abuse. Do UDS 1-4 times per year.  Score 8+ = High risk of abuse. Refer to specialist.    Last order of CONTROLLED SUBSTANCE TREATMENT AGREEMENT was found on 11/18/2019 from Office Visit on 11/18/2019     UDS Summary                URINE DRUG SCREEN Next Due 1/7/2021      Done 1/13/2020      Patient has more history with this topic...        Most recent UDS reviewed today and is consistent with prescribed medications.  Patient actually was off all prescribed medications at the time of that test.  It was negative for prescribed medications and also negative for any illicit substances.    I have reviewed the medical records, the Prescription Monitoring Program and I have determined that controlled substance treatment is medically indicated.       Followup: Return in about 3 months (around 6/6/2020), or if symptoms worsen or fail to improve.

## 2020-11-25 ENCOUNTER — TELEMEDICINE (OUTPATIENT)
Dept: MEDICAL GROUP | Facility: MEDICAL CENTER | Age: 40
End: 2020-11-25

## 2020-11-25 VITALS — WEIGHT: 145 LBS | BODY MASS INDEX: 23.3 KG/M2 | HEIGHT: 66 IN

## 2020-11-25 DIAGNOSIS — Z79.891 CHRONIC USE OF OPIATE FOR THERAPEUTIC PURPOSE: ICD-10-CM

## 2020-11-25 DIAGNOSIS — G43.709 CHRONIC MIGRAINE WITHOUT AURA WITHOUT STATUS MIGRAINOSUS, NOT INTRACTABLE: ICD-10-CM

## 2020-11-25 DIAGNOSIS — B00.1 HERPES LABIALIS: ICD-10-CM

## 2020-11-25 DIAGNOSIS — M54.14 THORACIC NEURITIS: ICD-10-CM

## 2020-11-25 DIAGNOSIS — M54.6 THORACIC SPINE PAIN: ICD-10-CM

## 2020-11-25 PROCEDURE — 99213 OFFICE O/P EST LOW 20 MIN: CPT | Mod: 95,CR | Performed by: FAMILY MEDICINE

## 2020-11-25 RX ORDER — HYDROCODONE BITARTRATE AND IBUPROFEN 7.5; 2 MG/1; MG/1
1 TABLET, FILM COATED ORAL EVERY 8 HOURS PRN
Qty: 90 TAB | Refills: 0 | Status: SHIPPED | OUTPATIENT
Start: 2020-12-26 | End: 2021-01-25

## 2020-11-25 RX ORDER — HYDROCODONE BITARTRATE AND IBUPROFEN 7.5; 2 MG/1; MG/1
1 TABLET, FILM COATED ORAL EVERY 8 HOURS PRN
Qty: 90 TAB | Refills: 0 | Status: SHIPPED | OUTPATIENT
Start: 2021-01-25 | End: 2021-03-03 | Stop reason: SDUPTHER

## 2020-11-25 RX ORDER — ACYCLOVIR 400 MG/1
400 TABLET ORAL 3 TIMES DAILY
Qty: 90 TAB | Refills: 6 | Status: SHIPPED | OUTPATIENT
Start: 2020-11-25 | End: 2021-03-03 | Stop reason: SDUPTHER

## 2020-11-25 RX ORDER — GABAPENTIN 100 MG/1
200 CAPSULE ORAL 3 TIMES DAILY
Qty: 180 CAP | Refills: 6 | Status: SHIPPED | OUTPATIENT
Start: 2020-11-25 | End: 2021-03-03 | Stop reason: SDUPTHER

## 2020-11-25 RX ORDER — HYDROCODONE BITARTRATE AND IBUPROFEN 7.5; 2 MG/1; MG/1
1 TABLET, FILM COATED ORAL EVERY 8 HOURS PRN
Qty: 90 TAB | Refills: 0 | Status: SHIPPED | OUTPATIENT
Start: 2020-11-25 | End: 2020-12-25

## 2020-11-25 NOTE — PROGRESS NOTES
Virtual Visit: Established Patient   This visit was conducted via Zoom  using secure and encrypted videoconferencing technology. The patient was in a private location in the state of [place].    The patient's identity was confirmed and verbal consent was obtained for this virtual visit.      CC: Chronic back pain, chronic neuritis chronic migraine                                                                                                                                      HPI:   Malaika presents today with the following.    1. Thoracic neuritis  The gabapentin continues to be very helpful for her thoracic neuritis.  This is renewed.  She did have 300 mg tablets which she has used up.  However, she finds that the 300 mg is too sedating and does not allow her to work and take care of her 4-year-old twins.    2. Thoracic spine pain  Patient has chronic thoracic spine pain.  The Vicoprofen has been the most helpful medication for this.  We have tried numerous muscle relaxants with no true benefit.  Patient denies somnolence or confusion on the regimen.  PDMP review shows that she has not renewed this in several months.  She states she did have some leftover medication that she used up and as she says she has been concentrating on her mother's medical problems not hers.    3. Chronic migraine without aura without status migrainosus, not intractable  Patient does have frequent severe headaches that are occasionally migrainous.  These are less than 10/month but sometimes quite severe.  Patient states the Vicoprofen continues to be very helpful when used for rescue.    4. Chronic use of opiate for therapeutic purpose  No aberrant or addictive use of medications has been observed.  No adverse events have been reported.  Patient counseled to not add Tylenol to current regimen.  Patient counseled to keep medications locked up or under personal control. [facility] board of pharmacy interface is reviewed.  No inconsistencies are  "found.  The patient's maximum MME is 22.5.  This is within CDC guideline for chronic pain prescribing by primary care.    5. Herpes labialis  Patient states the acyclovir continues to be very helpful in keeping this in control      Patient Active Problem List    Diagnosis Date Noted   • Vertigo 11/06/2017   • Influenza vaccine refused 10/16/2017   • Situational mixed anxiety and depressive disorder 12/13/2016   • Chronic use of opiate for therapeutic purpose 05/12/2016   • Chronic migraine without aura without status migrainosus, not intractable 10/29/2015   • Varicose veins of both legs with pain 01/02/2015   • Chronic pain syndrome 10/21/2014   • Dyslipidemia, goal LDL below 160    • Thoracic neuritis 08/13/2010   • Thoracic spine pain 05/29/2009       Current Outpatient Medications   Medication Sig Dispense Refill   • gabapentin (NEURONTIN) 100 MG Cap Take 2 Caps by mouth 3 times a day. 180 Cap 6   • acyclovir (ZOVIRAX) 400 MG tablet Take 1 Tab by mouth 3 times a day. 90 Tab 6   • hydrocodone-ibuprofen (VICOPROFEN) 7.5-200 MG per tablet Take 1 Tab by mouth every 8 hours as needed for Moderate Pain or Severe Pain (thoracic neuritis and thoracic spine pain) for up to 30 days. 90 Tab 0   • [START ON 12/26/2020] hydrocodone-ibuprofen (VICOPROFEN) 7.5-200 MG per tablet Take 1 Tab by mouth every 8 hours as needed for Moderate Pain or Severe Pain (thoracic neuritis and thoracic spine pain) for up to 30 days. 90 Tab 0   • [START ON 1/25/2021] hydrocodone-ibuprofen (VICOPROFEN) 7.5-200 MG per tablet Take 1 Tab by mouth every 8 hours as needed for Moderate Pain or Severe Pain (thoracic neuritis and thoracic spine pain) for up to 30 days. 90 Tab 0     No current facility-administered medications for this visit.          Allergies as of 11/25/2020 - Reviewed 11/25/2020   Allergen Reaction Noted   • Tape  03/15/2016        ROS:  Denies, chest pain, Shortness of breath, Edema.     Ht 1.676 m (5' 6\")   Wt 65.8 kg (145 lb)   " BMI 23.40 kg/m²       Physical Exam:  Constitutional: Alert, no distress, well-groomed.  Skin: No rashes in visible areas.  Eye: Round. Conjunctiva clear, lNo icterus.   ENMT: Lips pink without lesions, good dentition, moist mucous membranes. Phonation normal.  Neck: No masses, no thyromegaly. Moves freely without pain.  CV: Pulse as reported by patient  Respiratory: Unlabored respiratory effort, no cough or audible wheeze  Psych: Alert and oriented x3, normal affect and mood.          Assessment and Plan.   40 y.o. female with the following issues.    1. Thoracic neuritis/Thoracic spine pain  The medication regimen including gabapentin is helpful and well-tolerated and is renewed.  - hydrocodone-ibuprofen (VICOPROFEN) 7.5-200 MG per tablet; Take 1 Tab by mouth every 8 hours as needed for Moderate Pain or Severe Pain (thoracic neuritis and thoracic spine pain) for up to 30 days.  Dispense: 90 Tab; Refill: 0  - hydrocodone-ibuprofen (VICOPROFEN) 7.5-200 MG per tablet; Take 1 Tab by mouth every 8 hours as needed for Moderate Pain or Severe Pain (thoracic neuritis and thoracic spine pain) for up to 30 days.  Dispense: 90 Tab; Refill: 0  - hydrocodone-ibuprofen (VICOPROFEN) 7.5-200 MG per tablet; Take 1 Tab by mouth every 8 hours as needed for Moderate Pain or Severe Pain (thoracic neuritis and thoracic spine pain) for up to 30 days.  Dispense: 90 Tab; Refill: 0    2. Chronic migraine without aura without status migrainosus, not intractable  Patient has chronic headache.  This is intermittent but quite severe at times.  The medication continues to be helpful for rescue.  - hydrocodone-ibuprofen (VICOPROFEN) 7.5-200 MG per tablet; Take 1 Tab by mouth every 8 hours as needed for Moderate Pain or Severe Pain (thoracic neuritis and thoracic spine pain) for up to 30 days.  Dispense: 90 Tab; Refill: 0  - hydrocodone-ibuprofen (VICOPROFEN) 7.5-200 MG per tablet; Take 1 Tab by mouth every 8 hours as needed for Moderate Pain or  Severe Pain (thoracic neuritis and thoracic spine pain) for up to 30 days.  Dispense: 90 Tab; Refill: 0  - hydrocodone-ibuprofen (VICOPROFEN) 7.5-200 MG per tablet; Take 1 Tab by mouth every 8 hours as needed for Moderate Pain or Severe Pain (thoracic neuritis and thoracic spine pain) for up to 30 days.  Dispense: 90 Tab; Refill: 0    3. Chronic use of opiate for therapeutic purpose  Controlled substance treatment agreement is ordered to be renewed.  Patient has that in the past and urine drug screen results are on file and consistent with prescribing.  - Controlled Substance Treatment Agreement    4. Herpes labialis  Acyclovir continues to be very helpful for suppression and is renewed.  - acyclovir (ZOVIRAX) 400 MG tablet; Take 1 Tab by mouth 3 times a day.  Dispense: 90 Tab; Refill: 6    Chronic pain recheck for: Chronic thoracic pain, chronic migraine pain  Last dose of controlled substance: Today  Chronic pain treated with Vicoprofen taken 3-4 times a day    She  reports no history of alcohol use.  She  reports no history of drug use.    Interval history: Patient states she has been ignoring her pain as she was concentrating on helping her mother who had a brain tumor.  Any major change in health since last appointment? No    Consequences of Chronic Opiate therapy:  (5 A's)  Analgesia: Compared to no treatment or prior treatment, pain is currently improved  Activity: improved  Adverse Events: She denies constipation, itchy skin, nausea and sedation  Aberrant Behaviors: She reports she is taking medication as prescribed and is not veering from agreed treatment regimen or provider recommendations. There have been no inappropriate refills or lost/stolen meds reported.  Affect/Mood: Pain is impacting patient's mood.  Patient denies depression/anxiety.    Nonnarcotic treatments that are being used: NSAIDs/ALANIZ-2, Gabapentin, heat and Continues her PT exercises as well..     Last imaging: Spine MRI 2018    Opioid Risk  Score: 1    Interpretation of Opioid Risk Score   Score 0-3 = Low risk of abuse. Do UDS at least once per year.  Score 4-7 = Moderate risk of abuse. Do UDS 1-4 times per year.  Score 8+ = High risk of abuse. Refer to specialist.    Last order of CONTROLLED SUBSTANCE TREATMENT AGREEMENT was found on 11/25/2020 from Telemedicine on 11/25/2020     UDS Summary                URINE DRUG SCREEN Next Due 1/7/2021      Done 1/13/2020      Patient has more history with this topic...        Most recent UDS reviewed today and is consistent with prescribed medications.     I have reviewed the medical records, the Prescription Monitoring Program and I have determined that controlled substance treatment is medically indicated.     Recheck 3 to 4 months, sooner as needed.

## 2021-03-03 ENCOUNTER — TELEMEDICINE (OUTPATIENT)
Dept: MEDICAL GROUP | Facility: MEDICAL CENTER | Age: 41
End: 2021-03-03
Payer: COMMERCIAL

## 2021-03-03 VITALS — HEIGHT: 66 IN | BODY MASS INDEX: 24.11 KG/M2 | WEIGHT: 150 LBS

## 2021-03-03 DIAGNOSIS — Z79.891 CHRONIC USE OF OPIATE FOR THERAPEUTIC PURPOSE: ICD-10-CM

## 2021-03-03 DIAGNOSIS — G43.709 CHRONIC MIGRAINE WITHOUT AURA WITHOUT STATUS MIGRAINOSUS, NOT INTRACTABLE: ICD-10-CM

## 2021-03-03 DIAGNOSIS — Z83.3 FAMILY HISTORY OF DIABETES MELLITUS: ICD-10-CM

## 2021-03-03 DIAGNOSIS — M54.14 THORACIC NEURITIS: ICD-10-CM

## 2021-03-03 DIAGNOSIS — B00.1 HERPES LABIALIS: ICD-10-CM

## 2021-03-03 DIAGNOSIS — M54.6 THORACIC SPINE PAIN: ICD-10-CM

## 2021-03-03 PROCEDURE — 99213 OFFICE O/P EST LOW 20 MIN: CPT | Mod: 95,CR | Performed by: FAMILY MEDICINE

## 2021-03-03 RX ORDER — HYDROCODONE BITARTRATE AND IBUPROFEN 7.5; 2 MG/1; MG/1
1 TABLET, FILM COATED ORAL EVERY 8 HOURS PRN
Qty: 90 TABLET | Refills: 0 | Status: SHIPPED | OUTPATIENT
Start: 2021-03-03 | End: 2021-04-02

## 2021-03-03 RX ORDER — HYDROCODONE BITARTRATE AND IBUPROFEN 7.5; 2 MG/1; MG/1
1 TABLET, FILM COATED ORAL EVERY 8 HOURS PRN
Qty: 90 TABLET | Refills: 0 | Status: SHIPPED | OUTPATIENT
Start: 2021-05-02 | End: 2021-04-21 | Stop reason: SDUPTHER

## 2021-03-03 RX ORDER — HYDROCODONE BITARTRATE AND IBUPROFEN 7.5; 2 MG/1; MG/1
1 TABLET, FILM COATED ORAL EVERY 8 HOURS PRN
Qty: 90 TABLET | Refills: 0 | Status: SHIPPED | OUTPATIENT
Start: 2021-04-02 | End: 2021-05-02

## 2021-03-03 RX ORDER — GABAPENTIN 100 MG/1
200 CAPSULE ORAL 3 TIMES DAILY
Qty: 180 CAPSULE | Refills: 6 | Status: SHIPPED | OUTPATIENT
Start: 2021-03-03 | End: 2021-08-03

## 2021-03-03 RX ORDER — ACYCLOVIR 400 MG/1
400 TABLET ORAL 3 TIMES DAILY
Qty: 90 TABLET | Refills: 6 | Status: SHIPPED | OUTPATIENT
Start: 2021-03-03 | End: 2022-01-11 | Stop reason: SDUPTHER

## 2021-03-03 ASSESSMENT — PATIENT HEALTH QUESTIONNAIRE - PHQ9: CLINICAL INTERPRETATION OF PHQ2 SCORE: 0

## 2021-03-03 NOTE — PROGRESS NOTES
Virtual Visit: Established Patient   This visit was conducted via Zoom  using secure and encrypted videoconferencing technology. The patient was in a private location in the state of [place].    The patient's identity was confirmed and verbal consent was obtained for this virtual visit.      CC: Migraine, chronic back pain, lab testing                                                                                                                                      HPI:   Malaika presents today with the following.    1. Chronic migraine without aura without status migrainosus, not intractable  Patient continues to use the hydrocodone and ibuprofen combination as needed for rescue for her migraines.  She states generally she is controlling these well overall.  She continues her stretching and good hydration.  Patient states the gabapentin continues to be helpful as well.  This is renewed.    2. Thoracic spine pain/Thoracic neuritis  Patient has chronic thoracic spine pain.  She has done physical therapy in the past which was moderately helpful.  Other interventions have not been significantly helpful.  Patient states the ibuprofen and hydrocodone combination taking 2-3 times a day brings the pain from a 7 or 8 down to 5.  This allows her to take care of her children, work full-time and complete her ADLs.    3. Chronic use of opiate for therapeutic purpose  No aberrant or addictive use of medications has been observed.  No adverse events have been reported.  Patient counseled to not add Tylenol to current regimen.  Patient counseled to keep medications locked up or under personal control.  [facility] board of pharmacy interface is reviewed.  No inconsistencies are found.  The patient's maximum MME is 22.5.  This is within CDC guideline for chronic pain prescribing by primary care.    4. Family history of diabetes mellitus  Patient does have family history of diabetes.  Follow-up lab order discussed and placed.  Last lab  "result a year ago did not show elevated glucose.  Patient continues to be physically active.      Patient Active Problem List    Diagnosis Date Noted   • Vertigo 11/06/2017   • Influenza vaccine refused 10/16/2017   • Situational mixed anxiety and depressive disorder 12/13/2016   • Chronic use of opiate for therapeutic purpose 05/12/2016   • Chronic migraine without aura without status migrainosus, not intractable 10/29/2015   • Varicose veins of both legs with pain 01/02/2015   • Chronic pain syndrome 10/21/2014   • Dyslipidemia, goal LDL below 160    • Thoracic neuritis 08/13/2010   • Thoracic spine pain 05/29/2009       Current Outpatient Medications   Medication Sig Dispense Refill   • gabapentin (NEURONTIN) 100 MG Cap Take 2 Caps by mouth 3 times a day. 180 Cap 6   • acyclovir (ZOVIRAX) 400 MG tablet Take 1 Tab by mouth 3 times a day. 90 Tab 6     No current facility-administered medications for this visit.         Allergies as of 03/03/2021 - Reviewed 03/03/2021   Allergen Reaction Noted   • Tape  03/15/2016        ROS:  Denies, chest pain, Shortness of breath, Edema.  Denies fever or chills.    Ht 1.676 m (5' 6\")   Wt 68 kg (150 lb)   BMI 24.21 kg/m²       Physical Exam:  Constitutional: Alert, no distress, well-groomed.  Skin: No rashes in visible areas.  Eye: Round. Conjunctiva clear, No icterus.   ENMT: Lips pink without lesions, good dentition, moist mucous membranes. Phonation normal.  Neck: No masses, no thyromegaly. Moves freely without pain.  Respiratory: Unlabored respiratory effort, no cough or audible wheeze  Psych: Alert and oriented x3, normal affect and mood.          Assessment and Plan.   41 y.o. female with the following issues.    1. Chronic migraine without aura without status migrainosus, not intractable  The regimen continues to be helpful and well-tolerated and is renewed.  Lab orders discussed and placed.  - Comp Metabolic Panel; Future  - CBC WITHOUT DIFFERENTIAL; Future  - " hydrocodone-ibuprofen (VICOPROFEN) 7.5-200 MG per tablet; Take 1 tablet by mouth every 8 hours as needed for Moderate Pain or Severe Pain (thoracic neuritis and thoracic spine pain) for up to 30 days.  Dispense: 90 tablet; Refill: 0  - hydrocodone-ibuprofen (VICOPROFEN) 7.5-200 MG per tablet; Take 1 tablet by mouth every 8 hours as needed for Moderate Pain or Severe Pain (thoracic neuritis and thoracic spine pain) for up to 30 days.  Dispense: 90 tablet; Refill: 0  - hydrocodone-ibuprofen (VICOPROFEN) 7.5-200 MG per tablet; Take 1 tablet by mouth every 8 hours as needed for Moderate Pain or Severe Pain (thoracic neuritis and thoracic spine pain) for up to 30 days.  Dispense: 90 tablet; Refill: 0    2. Thoracic spine pain/Thoracic neuritis  The regimen is helpful and well-tolerated and is renewed  - hydrocodone-ibuprofen (VICOPROFEN) 7.5-200 MG per tablet; Take 1 tablet by mouth every 8 hours as needed for Moderate Pain or Severe Pain (thoracic neuritis and thoracic spine pain) for up to 30 days.  Dispense: 90 tablet; Refill: 0  - hydrocodone-ibuprofen (VICOPROFEN) 7.5-200 MG per tablet; Take 1 tablet by mouth every 8 hours as needed for Moderate Pain or Severe Pain (thoracic neuritis and thoracic spine pain) for up to 30 days.  Dispense: 90 tablet; Refill: 0  - hydrocodone-ibuprofen (VICOPROFEN) 7.5-200 MG per tablet; Take 1 tablet by mouth every 8 hours as needed for Moderate Pain or Severe Pain (thoracic neuritis and thoracic spine pain) for up to 30 days.  Dispense: 90 tablet; Refill: 0  - gabapentin (NEURONTIN) 100 MG Cap; Take 2 Capsules by mouth 3 times a day.  Dispense: 180 capsule; Refill: 6    3. Chronic use of opiate for therapeutic purpose  Controlled substance treatment agreement was signed in the past and is on file but she needs to update.  This is discussed and printed and will be mailed to the patient for review and signature.  She is due for urine drug screen.  Order is sent to the lab.  - Pain  Management Screen; Future  - Controlled Substance Treatment Agreement    4. Family history of diabetes mellitus  Family history of diabetes in her mother.  She is due for follow-up CMP.  Order discussed and placed.  - Comp Metabolic Panel; Future    5. Herpes labialis  Patient tends to get recurrent cold sores, asks for renewal of her medication.  - acyclovir (ZOVIRAX) 400 MG tablet; Take 1 tablet by mouth 3 times a day.  Dispense: 90 tablet; Refill: 6      Chronic pain recheck for: Chronic migraines, chronic complex thoracic pain with neuritis  Last dose of controlled substance: Today  Chronic pain treated with Vicoprofen (hydrocodone ibuprofen combination taken 2-3 times a day    She  reports no history of alcohol use.  She  reports no history of drug use.    Interval history: Patient has been trying to stretch out her medications.  She continues her exercise and PT exercises.  Any major change in health since last appointment? No    Consequences of Chronic Opiate therapy:  (5 A's)  Analgesia: Compared to no treatment or prior treatment, pain is currently improved  Activity: improved  Adverse Events: She denies constipation, itchy skin, nausea and sedation  Aberrant Behaviors: She reports she is taking medication as prescribed and is not veering from agreed treatment regimen or provider recommendations. There have been no inappropriate refills or lost/stolen meds reported.  Affect/Mood: Pain is impacting patient's mood.  Patient denies depression/anxiety.    Nonnarcotic treatments that are being used: NSAIDs/ALANIZ-2, Gabapentin, ice and heat.     Last imagin    Opioid Risk Score: 1    Interpretation of Opioid Risk Score   Score 0-3 = Low risk of abuse. Do UDS at least once per year.  Score 4-7 = Moderate risk of abuse. Do UDS 1-4 times per year.  Score 8+ = High risk of abuse. Refer to specialist.    Last order of CONTROLLED SUBSTANCE TREATMENT AGREEMENT was found on 2020 from Telemedicine on 2020      UDS Summary                URINE DRUG SCREEN Overdue 1/7/2021      Done 1/13/2020      Patient has more history with this topic...        Most recent UDS reviewed today and is consistent with prescribed medications.  UDS order placed today.    I have reviewed the medical records, the Prescription Monitoring Program and I have determined that controlled substance treatment is medically indicated.

## 2021-04-13 ENCOUNTER — TELEPHONE (OUTPATIENT)
Dept: MEDICAL GROUP | Facility: MEDICAL CENTER | Age: 41
End: 2021-04-13

## 2021-04-13 DIAGNOSIS — M54.50 LUMBAR PAIN: ICD-10-CM

## 2021-04-13 NOTE — TELEPHONE ENCOUNTER
MRI order is placed.  Patient has had thoracic MRIs in the past so just wanted to verify that this was indeed lumbar.

## 2021-04-14 ENCOUNTER — HOSPITAL ENCOUNTER (OUTPATIENT)
Dept: LAB | Facility: MEDICAL CENTER | Age: 41
End: 2021-04-14
Attending: FAMILY MEDICINE
Payer: COMMERCIAL

## 2021-04-14 DIAGNOSIS — G43.709 CHRONIC MIGRAINE WITHOUT AURA WITHOUT STATUS MIGRAINOSUS, NOT INTRACTABLE: ICD-10-CM

## 2021-04-14 DIAGNOSIS — Z79.891 CHRONIC USE OF OPIATE FOR THERAPEUTIC PURPOSE: ICD-10-CM

## 2021-04-14 DIAGNOSIS — Z83.3 FAMILY HISTORY OF DIABETES MELLITUS: ICD-10-CM

## 2021-04-14 LAB
ALBUMIN SERPL BCP-MCNC: 4.2 G/DL (ref 3.2–4.9)
ALBUMIN/GLOB SERPL: 1.4 G/DL
ALP SERPL-CCNC: 73 U/L (ref 30–99)
ALT SERPL-CCNC: 17 U/L (ref 2–50)
ANION GAP SERPL CALC-SCNC: 8 MMOL/L (ref 7–16)
AST SERPL-CCNC: 20 U/L (ref 12–45)
BILIRUB SERPL-MCNC: 0.2 MG/DL (ref 0.1–1.5)
BUN SERPL-MCNC: 8 MG/DL (ref 8–22)
CALCIUM SERPL-MCNC: 8.9 MG/DL (ref 8.5–10.5)
CHLORIDE SERPL-SCNC: 104 MMOL/L (ref 96–112)
CO2 SERPL-SCNC: 26 MMOL/L (ref 20–33)
CREAT SERPL-MCNC: 0.67 MG/DL (ref 0.5–1.4)
ERYTHROCYTE [DISTWIDTH] IN BLOOD BY AUTOMATED COUNT: 43.5 FL (ref 35.9–50)
GLOBULIN SER CALC-MCNC: 3 G/DL (ref 1.9–3.5)
GLUCOSE SERPL-MCNC: 73 MG/DL (ref 65–99)
HCT VFR BLD AUTO: 39.5 % (ref 37–47)
HGB BLD-MCNC: 13.3 G/DL (ref 12–16)
MCH RBC QN AUTO: 31.6 PG (ref 27–33)
MCHC RBC AUTO-ENTMCNC: 33.7 G/DL (ref 33.6–35)
MCV RBC AUTO: 93.8 FL (ref 81.4–97.8)
PLATELET # BLD AUTO: 252 K/UL (ref 164–446)
PMV BLD AUTO: 11.6 FL (ref 9–12.9)
POTASSIUM SERPL-SCNC: 4 MMOL/L (ref 3.6–5.5)
PROT SERPL-MCNC: 7.2 G/DL (ref 6–8.2)
RBC # BLD AUTO: 4.21 M/UL (ref 4.2–5.4)
SODIUM SERPL-SCNC: 138 MMOL/L (ref 135–145)
WBC # BLD AUTO: 7.5 K/UL (ref 4.8–10.8)

## 2021-04-14 PROCEDURE — 85027 COMPLETE CBC AUTOMATED: CPT

## 2021-04-14 PROCEDURE — 36415 COLL VENOUS BLD VENIPUNCTURE: CPT

## 2021-04-14 PROCEDURE — 80307 DRUG TEST PRSMV CHEM ANLYZR: CPT

## 2021-04-14 PROCEDURE — 80053 COMPREHEN METABOLIC PANEL: CPT

## 2021-04-18 LAB
1OH-MIDAZOLAM UR QL SCN: NOT DETECTED
6MAM UR QL: NOT DETECTED
7AMINOCLONAZEPAM UR QL: NOT DETECTED
A-OH ALPRAZ UR QL: NOT DETECTED
ALPRAZ UR QL: NOT DETECTED
AMPHET UR QL SCN: NOT DETECTED
ANNOTATION COMMENT IMP: NORMAL
ANNOTATION COMMENT IMP: NORMAL
BARBITURATES UR QL: NOT DETECTED
BUPRENORPHINE UR QL: NOT DETECTED
BZE UR QL: NOT DETECTED
CARBOXYTHC UR QL: NOT DETECTED
CARISOPRODOL UR QL: NOT DETECTED
CLONAZEPAM UR QL: NOT DETECTED
CODEINE UR QL: NOT DETECTED
DIAZEPAM UR QL: NOT DETECTED
ETHYL GLUCURONIDE UR QL: NOT DETECTED
FENTANYL UR QL: NOT DETECTED
GABAPENTIN UR QL: PRESENT
HYDROCODONE UR QL: PRESENT
HYDROMORPHONE UR QL: PRESENT
LORAZEPAM UR QL: NOT DETECTED
MDA UR QL: NOT DETECTED
MDEA UR QL: NOT DETECTED
MDMA UR QL: NOT DETECTED
MEPERIDINE UR QL: NOT DETECTED
METHADONE UR QL: NOT DETECTED
METHAMPHET UR QL: NOT DETECTED
MIDAZOLAM UR QL SCN: NOT DETECTED
MORPHINE UR QL: NOT DETECTED
NALOXONE UR QL SCN: NOT DETECTED
NORBUPRENORPHINE UR QL CFM: NOT DETECTED
NORDIAZEPAM UR QL: NOT DETECTED
NORFENTANYL UR QL: NOT DETECTED
NORHYDROCODONE UR QL CFM: PRESENT
NOROXYCODONE UR QL CFM: NOT DETECTED
NOROXYMORPH CO100 Q0458: NOT DETECTED
OXAZEPAM UR QL: NOT DETECTED
OXYCODONE UR QL: NOT DETECTED
OXYMORPHONE UR QL: NOT DETECTED
PATHOLOGY STUDY: NORMAL
PCP UR QL: NOT DETECTED
PHENTERMINE UR QL: NOT DETECTED
PPAA UR QL: NOT DETECTED
PREGABALIN UR QL SCN: NOT DETECTED
SERVICE CMNT-IMP: NORMAL
TAPENADOL OSULF CO200 Q0473: NOT DETECTED
TAPENTADOL UR QL SCN: NOT DETECTED
TEMAZEPAM UR QL: NOT DETECTED
TRAMADOL UR QL: NOT DETECTED
ZOLPIDEM PHENYL-4-CARB UR QL SCN: NOT DETECTED
ZOLPIDEM UR QL: NOT DETECTED

## 2021-04-21 ENCOUNTER — TELEMEDICINE (OUTPATIENT)
Dept: MEDICAL GROUP | Facility: MEDICAL CENTER | Age: 41
End: 2021-04-21
Payer: COMMERCIAL

## 2021-04-21 VITALS — BODY MASS INDEX: 24.11 KG/M2 | HEIGHT: 66 IN | WEIGHT: 150 LBS

## 2021-04-21 DIAGNOSIS — G43.709 CHRONIC MIGRAINE WITHOUT AURA WITHOUT STATUS MIGRAINOSUS, NOT INTRACTABLE: ICD-10-CM

## 2021-04-21 DIAGNOSIS — M54.14 THORACIC NEURITIS: ICD-10-CM

## 2021-04-21 DIAGNOSIS — M51.16 LUMBAR DISC DISEASE WITH RADICULOPATHY: ICD-10-CM

## 2021-04-21 DIAGNOSIS — M54.6 THORACIC SPINE PAIN: ICD-10-CM

## 2021-04-21 DIAGNOSIS — M51.46 SCHMORL'S NODES OF LUMBAR REGION: ICD-10-CM

## 2021-04-21 DIAGNOSIS — M54.50 LUMBAR PAIN: ICD-10-CM

## 2021-04-21 PROCEDURE — 99213 OFFICE O/P EST LOW 20 MIN: CPT | Mod: 95,CR | Performed by: FAMILY MEDICINE

## 2021-04-21 RX ORDER — HYDROCODONE BITARTRATE AND IBUPROFEN 7.5; 2 MG/1; MG/1
1 TABLET, FILM COATED ORAL EVERY 8 HOURS PRN
Qty: 90 TABLET | Refills: 0 | Status: SHIPPED | OUTPATIENT
Start: 2021-06-03 | End: 2021-07-13

## 2021-04-21 ASSESSMENT — FIBROSIS 4 INDEX: FIB4 SCORE: 0.75

## 2021-04-21 NOTE — PROGRESS NOTES
Virtual Visit: Established Patient   This visit was conducted via Zoom  using secure and encrypted videoconferencing technology. The patient was in a private location in the state of [place].    The patient's identity was confirmed and verbal consent was obtained for this virtual visit.      CC: Worsening low back pain                                                                                                                                      HPI:   Malaika presents today with the following.    1. Lumbar pain/Lumbar disc disease with radiculopathy/Schmorl's nodes of lumbar region  Patient has long-standing multilevel mixed degenerative change including lumbar disc disease, arthritis, ligamentous thickening and Schmorl's nodes.  Patient is using gabapentin as well as stretching to treat the radicular component.  The current regimen of Vicoprofen 7.5/200 is helping the pain bringing it from a level 7 or 8 to a level 5.  Lately there have been times when the pain is completely out of control.  The regimen is improving activity but having more struggles. The regimen allows the patient to work full-time, function is a single mother and complete her ADLs.  Patient denies somnolence, confusion, balance problems or severe constipation from the regimen. Patient notes side effect of dry mouth.  Patient denies new or worsening urine or stool incontinence. Patient denies leg weakness and balance problems.  Patient is contemplating comprehensive back treatment with PT chiropractic and acupuncture.    PDMP review shows no inconsistencies.  Patient UDS has been consistent with prescribed medication.    Patient was finally scheduled for MRI with [facility] but they are unable to do this until the first week of May.  Patient is trying to get this earlier as the first week of May will not fit with her schedule.  She requests that I send the MRI order to [facility].    2. Thoracic spine pain/thoracic neuritis  Chronic, also somewhat  "increased    She has had to reschedule her Covid vaccine due to viral symptoms.  She will get this done sent.    Patient Active Problem List    Diagnosis Date Noted   • Vertigo 11/06/2017   • Influenza vaccine refused 10/16/2017   • Situational mixed anxiety and depressive disorder 12/13/2016   • Chronic use of opiate for therapeutic purpose 05/12/2016   • Chronic migraine without aura without status migrainosus, not intractable 10/29/2015   • Varicose veins of both legs with pain 01/02/2015   • Chronic pain syndrome 10/21/2014   • Dyslipidemia, goal LDL below 160    • Thoracic neuritis 08/13/2010   • Thoracic spine pain 05/29/2009       Current Outpatient Medications   Medication Sig Dispense Refill   • [START ON 6/3/2021] hydrocodone-ibuprofen (VICOPROFEN) 7.5-200 MG per tablet Take 1 tablet by mouth every 8 hours as needed for Moderate Pain or Severe Pain (thoracic neuritis and thoracic spine pain) for up to 30 days. 90 tablet 0   • hydrocodone-ibuprofen (VICOPROFEN) 7.5-200 MG per tablet Take 1 tablet by mouth every 8 hours as needed for Moderate Pain or Severe Pain (thoracic neuritis and thoracic spine pain) for up to 30 days. 90 tablet 0   • gabapentin (NEURONTIN) 100 MG Cap Take 2 Capsules by mouth 3 times a day. 180 capsule 6   • acyclovir (ZOVIRAX) 400 MG tablet Take 1 tablet by mouth 3 times a day. 90 tablet 6     No current facility-administered medications for this visit.         Allergies as of 04/21/2021 - Reviewed 03/03/2021   Allergen Reaction Noted   • Tape  03/15/2016        ROS:  Denies, chest pain, Shortness of breath, Edema.   She is having cold and flu symptoms for a week.  Got COVID tested, negative.  She is feeling a little better but staying home.    Ht 1.676 m (5' 6\")   Wt 68 kg (150 lb)   BMI 24.21 kg/m²       Physical Exam:  Constitutional: Alert, no distress, well-groomed.  Skin: No rashes in visible areas.  Eye: Round. Conjunctiva clear, No icterus.   ENMT: Lips pink without lesions, " good dentition, moist mucous membranes. Phonation normal.  Neck: No masses, no thyromegaly. Moves freely without pain.  Respiratory: Unlabored respiratory effort, no cough or audible wheeze  Psych: Alert and oriented x3, normal affect and mood.      Marked lumbar disc disease and radicular pain    Assessment and Plan.   41 y.o. female with the following issues.    1. Lumbar pain/Lumbar disc disease with radiculopathy/Schmorl's nodes of lumbar region  The medication regimen is continued.  However, I am in complete agreement with her pursuit of further noninvasive treatment, nonsurgical treatment.  Patient has been very compliant with her PT exercises which helped for quite a while but clearly her spine problems are progressing.  - MR-LUMBAR SPINE-W/O; Future    2. Thoracic spine pain/Thoracic neuritis  The medication regimen does continue to be helpful and is renewed.    3. Chronic migraine without aura without status migrainosus, not intractable  Patient also uses the regimen as rescue for her chronic migraines.  She has had more stress lately and is getting a resurgence of these occasionally.  Denies somnolence or confusion from the regimen.  - hydrocodone-ibuprofen (VICOPROFEN) 7.5-200 MG per tablet; Take 1 tablet by mouth every 8 hours as needed for Moderate Pain or Severe Pain (thoracic neuritis and thoracic spine pain) for up to 30 days.  Dispense: 90 tablet; Refill: 0    Patient is also noticing a spike in the frequency of her herpes outbreaks.  The acyclovir is helpful.    Consequences of Chronic Opiate therapy:  (5 A's)  Analgesia:  improved  Activity:  improved  Adverse Events: None reported or observed  Aberrant Behaviors: None reported or observed  Affect/Mood: good grooming, full facial expressions, normal speech pattern and content, normal thought patterns, normal perception, good insight, normal reasoning  Last CMP:   Normal on April 14  Appropriate Imaging done:   Yes, further ordered  today        Recheck 2 months, sooner as needed

## 2021-04-22 ENCOUNTER — DOCUMENTATION (OUTPATIENT)
Dept: MEDICAL GROUP | Facility: MEDICAL CENTER | Age: 41
End: 2021-04-22

## 2021-06-17 ENCOUNTER — DOCUMENTATION (OUTPATIENT)
Dept: MEDICAL GROUP | Facility: MEDICAL CENTER | Age: 41
End: 2021-06-17

## 2021-06-17 DIAGNOSIS — R53.83 FATIGUE, UNSPECIFIED TYPE: ICD-10-CM

## 2021-06-17 DIAGNOSIS — M46.94: ICD-10-CM

## 2021-06-17 DIAGNOSIS — G43.709 CHRONIC MIGRAINE WITHOUT AURA WITHOUT STATUS MIGRAINOSUS, NOT INTRACTABLE: ICD-10-CM

## 2021-06-17 DIAGNOSIS — M54.6 PAIN IN THORACIC SPINE: ICD-10-CM

## 2021-06-17 DIAGNOSIS — R11.0 NAUSEA IN ADULT: ICD-10-CM

## 2021-06-29 LAB
BASOPHILS # BLD AUTO: 0.1 X10E3/UL (ref 0–0.2)
BASOPHILS NFR BLD AUTO: 1 %
CRP SERPL-MCNC: 2 MG/L (ref 0–10)
EOSINOPHIL # BLD AUTO: 0.1 X10E3/UL (ref 0–0.4)
EOSINOPHIL NFR BLD AUTO: 1 %
ERYTHROCYTE [DISTWIDTH] IN BLOOD BY AUTOMATED COUNT: 12.7 % (ref 11.7–15.4)
HCT VFR BLD AUTO: 37.9 % (ref 34–46.6)
HGB BLD-MCNC: 12.8 G/DL (ref 11.1–15.9)
IMM GRANULOCYTES # BLD AUTO: 0 X10E3/UL (ref 0–0.1)
IMM GRANULOCYTES NFR BLD AUTO: 0 %
IMMATURE CELLS  115398: NORMAL
IRON SATN MFR SERPL: 13 % (ref 15–55)
IRON SERPL-MCNC: 40 UG/DL (ref 27–159)
LYMPHOCYTES # BLD AUTO: 1.8 X10E3/UL (ref 0.7–3.1)
LYMPHOCYTES NFR BLD AUTO: 30 %
MCH RBC QN AUTO: 31.8 PG (ref 26.6–33)
MCHC RBC AUTO-ENTMCNC: 33.8 G/DL (ref 31.5–35.7)
MCV RBC AUTO: 94 FL (ref 79–97)
MONOCYTES # BLD AUTO: 0.5 X10E3/UL (ref 0.1–0.9)
MONOCYTES NFR BLD AUTO: 7 %
MORPHOLOGY BLD-IMP: NORMAL
NEUTROPHILS # BLD AUTO: 3.7 X10E3/UL (ref 1.4–7)
NEUTROPHILS NFR BLD AUTO: 61 %
NRBC BLD AUTO-RTO: NORMAL %
PLATELET # BLD AUTO: 250 X10E3/UL (ref 150–450)
RBC # BLD AUTO: 4.03 X10E6/UL (ref 3.77–5.28)
TIBC SERPL-MCNC: 311 UG/DL (ref 250–450)
TSH SERPL DL<=0.005 MIU/L-ACNC: 1.36 UIU/ML (ref 0.45–4.5)
UIBC SERPL-MCNC: 271 UG/DL (ref 131–425)
VIT B12 SERPL-MCNC: >2000 PG/ML (ref 232–1245)
WBC # BLD AUTO: 6.1 X10E3/UL (ref 3.4–10.8)

## 2021-07-10 DIAGNOSIS — Z30.011 ORAL CONTRACEPTION INITIAL PRESCRIPTION: ICD-10-CM

## 2021-07-10 RX ORDER — ACETAMINOPHEN AND CODEINE PHOSPHATE 120; 12 MG/5ML; MG/5ML
1 SOLUTION ORAL DAILY
Qty: 28 TABLET | Refills: 6 | Status: SHIPPED | OUTPATIENT
Start: 2021-07-10 | End: 2021-08-01

## 2021-07-13 DIAGNOSIS — M54.6 THORACIC SPINE PAIN: ICD-10-CM

## 2021-07-13 DIAGNOSIS — M54.14 THORACIC NEURITIS: ICD-10-CM

## 2021-07-13 DIAGNOSIS — G43.709 CHRONIC MIGRAINE WITHOUT AURA WITHOUT STATUS MIGRAINOSUS, NOT INTRACTABLE: ICD-10-CM

## 2021-07-13 RX ORDER — HYDROCODONE BITARTRATE AND IBUPROFEN 7.5; 2 MG/1; MG/1
1 TABLET, FILM COATED ORAL EVERY 8 HOURS PRN
Qty: 30 TABLET | Refills: 0 | Status: SHIPPED | OUTPATIENT
Start: 2021-07-13 | End: 2021-08-03

## 2021-07-31 DIAGNOSIS — Z30.011 ORAL CONTRACEPTION INITIAL PRESCRIPTION: ICD-10-CM

## 2021-08-01 RX ORDER — NORETHINDRONE 0.35 MG/1
TABLET ORAL
Qty: 28 TABLET | Refills: 6 | Status: SHIPPED | OUTPATIENT
Start: 2021-08-01 | End: 2022-01-11

## 2021-08-03 ENCOUNTER — TELEMEDICINE (OUTPATIENT)
Dept: MEDICAL GROUP | Facility: MEDICAL CENTER | Age: 41
End: 2021-08-03
Payer: COMMERCIAL

## 2021-08-03 VITALS — WEIGHT: 150 LBS | BODY MASS INDEX: 24.99 KG/M2 | RESPIRATION RATE: 14 BRPM | HEIGHT: 65 IN

## 2021-08-03 DIAGNOSIS — Z30.09 CONSULTATION FOR FEMALE STERILIZATION: ICD-10-CM

## 2021-08-03 DIAGNOSIS — M54.6 THORACIC SPINE PAIN: ICD-10-CM

## 2021-08-03 DIAGNOSIS — G43.709 CHRONIC MIGRAINE WITHOUT AURA WITHOUT STATUS MIGRAINOSUS, NOT INTRACTABLE: ICD-10-CM

## 2021-08-03 DIAGNOSIS — Z79.891 CHRONIC USE OF OPIATE FOR THERAPEUTIC PURPOSE: ICD-10-CM

## 2021-08-03 DIAGNOSIS — M54.14 THORACIC NEURITIS: ICD-10-CM

## 2021-08-03 PROCEDURE — 99213 OFFICE O/P EST LOW 20 MIN: CPT | Mod: 95 | Performed by: FAMILY MEDICINE

## 2021-08-03 RX ORDER — HYDROCODONE BITARTRATE AND IBUPROFEN 7.5; 2 MG/1; MG/1
1 TABLET, FILM COATED ORAL EVERY 8 HOURS PRN
Qty: 90 TABLET | Refills: 0 | Status: SHIPPED | OUTPATIENT
Start: 2021-08-03 | End: 2021-09-02

## 2021-08-03 RX ORDER — HYDROCODONE BITARTRATE AND IBUPROFEN 7.5; 2 MG/1; MG/1
1 TABLET, FILM COATED ORAL EVERY 8 HOURS PRN
Qty: 90 TABLET | Refills: 0 | Status: SHIPPED | OUTPATIENT
Start: 2021-09-02 | End: 2021-10-02

## 2021-08-03 RX ORDER — HYDROCODONE BITARTRATE AND IBUPROFEN 7.5; 2 MG/1; MG/1
1 TABLET, FILM COATED ORAL EVERY 8 HOURS PRN
Qty: 90 TABLET | Refills: 0 | Status: SHIPPED | OUTPATIENT
Start: 2021-10-02 | End: 2022-01-11 | Stop reason: SDUPTHER

## 2021-08-03 RX ORDER — GABAPENTIN 100 MG/1
200 CAPSULE ORAL
Qty: 60 CAPSULE | Refills: 6 | Status: SHIPPED | OUTPATIENT
Start: 2021-08-03 | End: 2022-02-22 | Stop reason: SDUPTHER

## 2021-08-03 ASSESSMENT — FIBROSIS 4 INDEX: FIB4 SCORE: 0.76

## 2021-08-03 NOTE — PROGRESS NOTES
Virtual Visit: Established Patient   This visit was conducted via Zoom  using secure and encrypted videoconferencing technology. The patient was in a private location in the [facility] of [place].    The patient's identity was confirmed and verbal consent was obtained for this virtual visit.      CC: back pain, arthritis, migraine                                                                                                                                  HPI:   Malaika presents today with the following.    1. Thoracic neuritis/Thoracic spine pain  Patient continues to have thoracic neuritis and spine pain.  She states that change in diet and reduction of sugars and starches has been helpful.  She continues her stretching and PT exercises.  She states the Vicoprofen does continue to be quite helpful.  She is trying to take 2 a day only.  She finds in the warmer weather she usually can do that.  However, when cold weather or storms rolling and she has to take the 3/day.  PDMP is reviewed.  I have renewed the medication.  Denies somnolence or confusion.  Patient states the medication is helpful and reduces the pain to where she can work full-time and take care of her ADLs and her children.    2. Chronic migraine without aura without status migrainosus, not intractable  Patient does continue to have episodic migraine.  She has been able to reduce the gabapentin and the migraines have not flared further.  She is taking the gabapentin at nighttime and is no longer taking it during the daytime.  The Vicoprofen continues to be quite helpful for rescue for migraine.    3. Chronic use of opiate for therapeutic purpose  No aberrant or addictive use of medications has been observed.  No adverse events have been reported.  Patient counseled to not add Tylenol to current regimen.  Patient counseled to keep medications locked up or under personal control.  [facility] board of pharmacy interface is reviewed.  No inconsistencies are found.   The patient's maximum MME is 22.5.  This is within CDC guideline for chronic pain prescribing by primary care.    4. Consultation for female sterilization  Patient is desiring sterilization.  She is raising her children successfully but would prefer to have more definitive sterilization and stop contraception.  She feels that another pregnancy will just make her back so much worse as the previous pregnancies did indeed worsen her back problems.  Referral to gynecology is discussed and placed.  - REFERRAL TO OB/GYN      Patient Active Problem List    Diagnosis Date Noted   • Vertigo 11/06/2017   • Influenza vaccine refused 10/16/2017   • Situational mixed anxiety and depressive disorder 12/13/2016   • Chronic use of opiate for therapeutic purpose 05/12/2016   • Chronic migraine without aura without status migrainosus, not intractable 10/29/2015   • Varicose veins of both legs with pain 01/02/2015   • Chronic pain syndrome 10/21/2014   • Dyslipidemia, goal LDL below 160    • Thoracic neuritis 08/13/2010   • Thoracic spine pain 05/29/2009       Current Outpatient Medications   Medication Sig Dispense Refill   • gabapentin (NEURONTIN) 100 MG Cap Take 2 Capsules by mouth at bedtime. 60 capsule 6   • hydrocodone-ibuprofen (VICOPROFEN) 7.5-200 MG per tablet Take 1 tablet by mouth every 8 hours as needed for Moderate Pain or Severe Pain (thoracic neuritis and thoracic spine pain) for up to 30 days. 90 tablet 0   • [START ON 9/2/2021] hydrocodone-ibuprofen (VICOPROFEN) 7.5-200 MG per tablet Take 1 tablet by mouth every 8 hours as needed for Moderate Pain or Severe Pain (thoracic neuritis and thoracic spine pain) for up to 30 days. 90 tablet 0   • [START ON 10/2/2021] hydrocodone-ibuprofen (VICOPROFEN) 7.5-200 MG per tablet Take 1 tablet by mouth every 8 hours as needed for Moderate Pain or Severe Pain (thoracic neuritis and thoracic spine pain) for up to 30 days. 90 tablet 0   • FELICIA 0.35 MG tablet TAKE 1 TABLET BY MOUTH  "EVERY DAY 28 tablet 6   • acyclovir (ZOVIRAX) 400 MG tablet Take 1 tablet by mouth 3 times a day. 90 tablet 6     No current facility-administered medications for this visit.         Allergies as of 08/03/2021 - Reviewed 08/03/2021   Allergen Reaction Noted   • Tape  03/15/2016        ROS:  Denies, chest pain, Shortness of breath, Edema.     Ht 1.651 m (5' 5\")   Wt 68 kg (150 lb)   BMI 24.96 kg/m²       Physical Exam:  Constitutional: Alert, no distress, well-groomed.  Skin: No rashes in visible areas.  Eye: Round. Conjunctiva clear, lNo icterus.   ENMT: Lips pink without lesions, good dentition, moist mucous membranes. Phonation normal.  Neck: No masses, no thyromegaly. Moves freely without pain.  CV: Pulse as reported by patient  Respiratory: Unlabored respiratory effort, no cough or audible wheeze  Psych: Alert and oriented x3, normal affect and mood.          Assessment and Plan.   41 y.o. female with the following issues.    1. Thoracic neuritis/Thoracic spine pain  The regimen is helpful and well-tolerated and is renewed  - gabapentin (NEURONTIN) 100 MG Cap; Take 2 Capsules by mouth at bedtime.  Dispense: 60 capsule; Refill: 6  - hydrocodone-ibuprofen (VICOPROFEN) 7.5-200 MG per tablet; Take 1 tablet by mouth every 8 hours as needed for Moderate Pain or Severe Pain (thoracic neuritis and thoracic spine pain) for up to 30 days.  Dispense: 90 tablet; Refill: 0  - hydrocodone-ibuprofen (VICOPROFEN) 7.5-200 MG per tablet; Take 1 tablet by mouth every 8 hours as needed for Moderate Pain or Severe Pain (thoracic neuritis and thoracic spine pain) for up to 30 days.  Dispense: 90 tablet; Refill: 0  - hydrocodone-ibuprofen (VICOPROFEN) 7.5-200 MG per tablet; Take 1 tablet by mouth every 8 hours as needed for Moderate Pain or Severe Pain (thoracic neuritis and thoracic spine pain) for up to 30 days.  Dispense: 90 tablet; Refill: 0    2. Chronic migraine without aura without status migrainosus, not intractable  The " regimen is helpful and well-tolerated and is renewed  - hydrocodone-ibuprofen (VICOPROFEN) 7.5-200 MG per tablet; Take 1 tablet by mouth every 8 hours as needed for Moderate Pain or Severe Pain (thoracic neuritis and thoracic spine pain) for up to 30 days.  Dispense: 90 tablet; Refill: 0  - hydrocodone-ibuprofen (VICOPROFEN) 7.5-200 MG per tablet; Take 1 tablet by mouth every 8 hours as needed for Moderate Pain or Severe Pain (thoracic neuritis and thoracic spine pain) for up to 30 days.  Dispense: 90 tablet; Refill: 0  - hydrocodone-ibuprofen (VICOPROFEN) 7.5-200 MG per tablet; Take 1 tablet by mouth every 8 hours as needed for Moderate Pain or Severe Pain (thoracic neuritis and thoracic spine pain) for up to 30 days.  Dispense: 90 tablet; Refill: 0    3. Chronic use of opiate for therapeutic purpose  Patient's urine drug screen is up-to-date but she is due for renewal of her controlled substance treatment agreement.  Discussed, printed and is going to be mailed to patient.  She will review, sign and return.  - Controlled Substance Treatment Agreement    4. Consultation for female sterilization  Referral is discussed and placed.  - REFERRAL TO OB/GYN    Chronic pain recheck for: Chronic thoracic spine pain, episodic migraine pain  Last dose of controlled substance: Today  Chronic pain treated with Vicoprofen taken 3 times a day    She  reports no history of alcohol use.  She  reports no history of drug use.    Interval history: Patient feels she has been stable overall.  The hot weather actually helps her back.  Changes in diet have been quite helpful overall.  Any major change in health since last appointment? No    Consequences of Chronic Opiate therapy:  (5 A's)  Analgesia: Compared to no treatment or prior treatment, pain is currently improved  Activity: improved  Adverse Events: She denies constipation, itchy skin, nausea and sedation  Aberrant Behaviors: She reports she is taking medication as prescribed and  is not veering from agreed treatment regimen or provider recommendations. There have been no inappropriate refills or lost/stolen meds reported.  Affect/Mood: Pain is impacting patient's mood.  However improved diet seems to have made a difference.  She continues her stretching and topical treatments.  Patient denies depression/anxiety.    Nonnarcotic treatments that are being used: NSAIDs/ALANIZ-2, Gabapentin, topical agents, ice and heat.     Last imaging: Last month, no significant change    Opioid Risk Score: 1    Interpretation of Opioid Risk Score   Score 0-3 = Low risk of abuse. Do UDS at least once per year.  Score 4-7 = Moderate risk of abuse. Do UDS 1-4 times per year.  Score 8+ = High risk of abuse. Refer to specialist.    Last order of CONTROLLED SUBSTANCE TREATMENT AGREEMENT was found on 8/3/2021 from Telemedicine on 8/3/2021     UDS Summary                URINE DRUG SCREEN Done 4/14/2021 PAIN MANAGEMENT SCREEN     Patient has more history with this topic...        Most recent UDS reviewed today and is consistent with prescribed medications.     I have reviewed the medical records, the Prescription Monitoring Program and I have determined that controlled substance treatment is medically indicated.     Recheck 3 months, sooner as needed

## 2021-11-02 DIAGNOSIS — Z23 NEED FOR SHINGLES VACCINE: ICD-10-CM

## 2021-11-02 RX ORDER — ZOSTER VACCINE RECOMBINANT, ADJUVANTED 50 MCG/0.5
0.5 KIT INTRAMUSCULAR ONCE
Qty: 1 EACH | Refills: 0 | Status: SHIPPED | OUTPATIENT
Start: 2021-11-02 | End: 2021-11-02

## 2021-11-16 DIAGNOSIS — B96.89 BACTERIAL CONJUNCTIVITIS OF BOTH EYES: ICD-10-CM

## 2021-11-16 DIAGNOSIS — H10.9 BACTERIAL CONJUNCTIVITIS OF BOTH EYES: ICD-10-CM

## 2021-11-16 RX ORDER — SULFACETAMIDE SODIUM 100 MG/ML
1 SOLUTION/ DROPS OPHTHALMIC 4 TIMES DAILY
Qty: 10 ML | Refills: 0 | Status: SHIPPED | OUTPATIENT
Start: 2021-11-16 | End: 2022-01-11

## 2021-11-16 NOTE — PROGRESS NOTES
Her children have given her valencia.  Prescription sent to pharmacy.  I have sent that prescription to [facility].

## 2021-12-02 DIAGNOSIS — K21.9 GASTROESOPHAGEAL REFLUX DISEASE, UNSPECIFIED WHETHER ESOPHAGITIS PRESENT: ICD-10-CM

## 2021-12-02 RX ORDER — PANTOPRAZOLE SODIUM 40 MG/1
40 TABLET, DELAYED RELEASE ORAL DAILY
Qty: 30 TABLET | Refills: 2 | Status: SHIPPED | OUTPATIENT
Start: 2021-12-02 | End: 2022-01-26

## 2021-12-09 DIAGNOSIS — B37.9 YEAST INFECTION: ICD-10-CM

## 2021-12-09 RX ORDER — FLUCONAZOLE 150 MG/1
150 TABLET ORAL DAILY
Qty: 3 TABLET | Refills: 0 | Status: SHIPPED | OUTPATIENT
Start: 2021-12-09 | End: 2022-01-03

## 2021-12-31 DIAGNOSIS — B37.9 YEAST INFECTION: ICD-10-CM

## 2022-01-03 RX ORDER — FLUCONAZOLE 150 MG/1
150 TABLET ORAL DAILY
Qty: 3 TABLET | Refills: 0 | Status: SHIPPED | OUTPATIENT
Start: 2022-01-03 | End: 2022-02-22 | Stop reason: SDUPTHER

## 2022-01-11 ENCOUNTER — TELEMEDICINE (OUTPATIENT)
Dept: MEDICAL GROUP | Facility: MEDICAL CENTER | Age: 42
End: 2022-01-11
Payer: COMMERCIAL

## 2022-01-11 VITALS — WEIGHT: 150 LBS | BODY MASS INDEX: 25.61 KG/M2 | RESPIRATION RATE: 14 BRPM | HEIGHT: 64 IN

## 2022-01-11 DIAGNOSIS — M54.6 THORACIC SPINE PAIN: ICD-10-CM

## 2022-01-11 DIAGNOSIS — B00.1 HERPES LABIALIS: ICD-10-CM

## 2022-01-11 DIAGNOSIS — G43.709 CHRONIC MIGRAINE WITHOUT AURA WITHOUT STATUS MIGRAINOSUS, NOT INTRACTABLE: ICD-10-CM

## 2022-01-11 DIAGNOSIS — M54.14 THORACIC NEURITIS: ICD-10-CM

## 2022-01-11 DIAGNOSIS — Z30.011 ORAL CONTRACEPTION INITIAL PRESCRIPTION: ICD-10-CM

## 2022-01-11 DIAGNOSIS — Z79.891 CHRONIC USE OF OPIATE FOR THERAPEUTIC PURPOSE: ICD-10-CM

## 2022-01-11 PROCEDURE — 99213 OFFICE O/P EST LOW 20 MIN: CPT | Mod: 95 | Performed by: FAMILY MEDICINE

## 2022-01-11 RX ORDER — HYDROCODONE BITARTRATE AND IBUPROFEN 7.5; 2 MG/1; MG/1
1 TABLET, FILM COATED ORAL EVERY 8 HOURS PRN
Qty: 90 TABLET | Refills: 0 | Status: SHIPPED | OUTPATIENT
Start: 2022-03-12 | End: 2022-01-27 | Stop reason: SDUPTHER

## 2022-01-11 RX ORDER — CEFUROXIME AXETIL 500 MG/1
500 TABLET ORAL 2 TIMES DAILY
COMMUNITY
Start: 2021-11-20 | End: 2022-01-11

## 2022-01-11 RX ORDER — HYDROCODONE BITARTRATE AND IBUPROFEN 7.5; 2 MG/1; MG/1
1 TABLET, FILM COATED ORAL EVERY 8 HOURS PRN
Qty: 90 TABLET | Refills: 0 | Status: SHIPPED | OUTPATIENT
Start: 2022-01-11 | End: 2022-01-19 | Stop reason: SDUPTHER

## 2022-01-11 RX ORDER — ACYCLOVIR 400 MG/1
400 TABLET ORAL 3 TIMES DAILY
Qty: 90 TABLET | Refills: 6 | Status: SHIPPED | OUTPATIENT
Start: 2022-01-11 | End: 2022-02-22 | Stop reason: SDUPTHER

## 2022-01-11 RX ORDER — HYDROCODONE BITARTRATE AND IBUPROFEN 7.5; 2 MG/1; MG/1
1 TABLET, FILM COATED ORAL EVERY 8 HOURS PRN
Qty: 90 TABLET | Refills: 0 | Status: SHIPPED | OUTPATIENT
Start: 2022-02-10 | End: 2022-01-27 | Stop reason: SDUPTHER

## 2022-01-11 RX ORDER — NORETHINDRONE 0.35 MG/1
TABLET ORAL
Qty: 84 TABLET | Refills: 2 | Status: SHIPPED | OUTPATIENT
Start: 2022-01-11 | End: 2022-02-22 | Stop reason: SDUPTHER

## 2022-01-11 RX ORDER — HYDROCODONE BITARTRATE AND IBUPROFEN 7.5; 2 MG/1; MG/1
TABLET, FILM COATED ORAL
COMMUNITY
Start: 2021-11-29 | End: 2022-01-11

## 2022-01-11 ASSESSMENT — FIBROSIS 4 INDEX: FIB4 SCORE: 0.76

## 2022-01-11 ASSESSMENT — PATIENT HEALTH QUESTIONNAIRE - PHQ9: CLINICAL INTERPRETATION OF PHQ2 SCORE: 0

## 2022-01-11 NOTE — PROGRESS NOTES
Virtual Visit: Established Patient   This visit was conducted via Zoom  using secure and encrypted videoconferencing technology. The patient was in a private location in the state of [place].    The patient's identity was confirmed and verbal consent was obtained for this virtual visit.      CC: Chronic back pain, chronic migraine, medication renewal                                                                                                                                   HPI:   Malaika presents today with the following.    1. Thoracic neuritis/Thoracic spine pain  Patient has chronic thoracic and lumbar lumbar pain.  She has been quite stable on the Vicoprofen.  She is taking 2 to 3/day.  She states this regimen brings her pain from a 7 or 8 down to a 5 which allows her to work full-time and complete her ADLs.  Sometimes the pain is better than that.  She has had numerous treatments including physical therapy and injections which did not resolve her issue.  She does do some chiropractic and also uses ice and heat along with gabapentin which is helpful.  She denies somnolence or confusion from the regimen.    2. Chronic migraine without aura without status migrainosus, not intractable  Overall her migraines are doing fairly well.  They are still episodic, several times a month and the Vicoprofen is helpful for rescue.  Denies change in vision or speech.  Denies weakness.    3. Chronic use of opiate for therapeutic purpose  No aberrant or addictive use of medications has been observed.  No adverse events have been reported.  Patient counseled to not add Tylenol to current regimen.  Patient counseled to keep medications locked up or under personal control.  [facility] board of pharmacy interface is reviewed.  No inconsistencies are found.  The patient's maximum MME is 22.5.  This is within CDC guideline for chronic pain prescribing by primary care.    5. Herpes labialis  Patient needs renewal of her acyclovir.  This  continues to be very helpful in controlling her cold sores.    She has her Pap smear scheduled for first week of February.    Patient Active Problem List    Diagnosis Date Noted   • Vertigo 11/06/2017   • Influenza vaccine refused 10/16/2017   • Situational mixed anxiety and depressive disorder 12/13/2016   • Chronic use of opiate for therapeutic purpose 05/12/2016   • Chronic migraine without aura without status migrainosus, not intractable 10/29/2015   • Varicose veins of both legs with pain 01/02/2015   • Chronic pain syndrome 10/21/2014   • Dyslipidemia, goal LDL below 160    • Thoracic neuritis 08/13/2010   • Thoracic spine pain 05/29/2009       Current Outpatient Medications   Medication Sig Dispense Refill   • FELICIA 0.35 MG tablet TAKE 1 TABLET BY MOUTH EVERY DAY 84 Tablet 2   • acyclovir (ZOVIRAX) 400 MG tablet Take 1 Tablet by mouth 3 times a day. 90 Tablet 6   • hydrocodone-ibuprofen (VICOPROFEN) 7.5-200 MG per tablet Take 1 Tablet by mouth every 8 hours as needed for Moderate Pain or Severe Pain (thoracic neuritis and thoracic spine pain) for up to 30 days. 90 Tablet 0   • [START ON 2/10/2022] hydrocodone-ibuprofen (VICOPROFEN) 7.5-200 MG per tablet Take 1 Tablet by mouth every 8 hours as needed for Moderate Pain or Severe Pain (thoracic neuritis and thoracic spine pain) for up to 30 days. 90 Tablet 0   • [START ON 3/12/2022] hydrocodone-ibuprofen (VICOPROFEN) 7.5-200 MG per tablet Take 1 Tablet by mouth every 8 hours as needed for Moderate Pain or Severe Pain (thoracic neuritis and thoracic spine pain) for up to 30 days. 90 Tablet 0   • fluconazole (DIFLUCAN) 150 MG tablet TAKE 1 TABLET BY MOUTH EVERY DAY 3 Tablet 0   • pantoprazole (PROTONIX) 40 MG Tablet Delayed Response Take 1 Tablet by mouth every day. 30 Tablet 2   • gabapentin (NEURONTIN) 100 MG Cap Take 2 Capsules by mouth at bedtime. 60 capsule 6     No current facility-administered medications for this visit.         Allergies as of 01/11/2022  "- Reviewed 01/11/2022   Allergen Reaction Noted   • Tape  03/15/2016        ROS:  Denies, chest pain, Shortness of breath, Edema.  Recovering from a cold.  Feeling much better.  Her children had this as well and brought at home from school.    Resp 14   Ht 1.626 m (5' 4\")   Wt 68 kg (150 lb)   BMI 25.75 kg/m²       Physical Exam:  Constitutional: Alert, no distress, well-groomed.  Skin: No rashes in visible areas.  Eye: Round. Conjunctiva clear, No icterus.   ENMT: Lips pink without lesions, good dentition, moist mucous membranes. Phonation normal.  Neck: No masses, no thyromegaly. Moves freely without pain.  Respiratory: Unlabored respiratory effort, no cough or audible wheeze  Psych: Alert and oriented x3, normal affect and mood.          Assessment and Plan.   41 y.o. female with the following issues.    1. Thoracic neuritis/Thoracic spine pain  The regimen is helpful and well-tolerated and is renewed  - hydrocodone-ibuprofen (VICOPROFEN) 7.5-200 MG per tablet; Take 1 Tablet by mouth every 8 hours as needed for Moderate Pain or Severe Pain (thoracic neuritis and thoracic spine pain) for up to 30 days.  Dispense: 90 Tablet; Refill: 0  - hydrocodone-ibuprofen (VICOPROFEN) 7.5-200 MG per tablet; Take 1 Tablet by mouth every 8 hours as needed for Moderate Pain or Severe Pain (thoracic neuritis and thoracic spine pain) for up to 30 days.  Dispense: 90 Tablet; Refill: 0  - hydrocodone-ibuprofen (VICOPROFEN) 7.5-200 MG per tablet; Take 1 Tablet by mouth every 8 hours as needed for Moderate Pain or Severe Pain (thoracic neuritis and thoracic spine pain) for up to 30 days.  Dispense: 90 Tablet; Refill: 0    2. Chronic migraine without aura without status migrainosus, not intractable  Patient uses the medication for rescue for migraines.  This has been successful.  - hydrocodone-ibuprofen (VICOPROFEN) 7.5-200 MG per tablet; Take 1 Tablet by mouth every 8 hours as needed for Moderate Pain or Severe Pain (thoracic " neuritis and thoracic spine pain) for up to 30 days.  Dispense: 90 Tablet; Refill: 0  - hydrocodone-ibuprofen (VICOPROFEN) 7.5-200 MG per tablet; Take 1 Tablet by mouth every 8 hours as needed for Moderate Pain or Severe Pain (thoracic neuritis and thoracic spine pain) for up to 30 days.  Dispense: 90 Tablet; Refill: 0  - hydrocodone-ibuprofen (VICOPROFEN) 7.5-200 MG per tablet; Take 1 Tablet by mouth every 8 hours as needed for Moderate Pain or Severe Pain (thoracic neuritis and thoracic spine pain) for up to 30 days.  Dispense: 90 Tablet; Refill: 0    3. Chronic use of opiate for therapeutic purpose  Controlled substance treatment agreement needs to be renewed.  This is discussed.  It will be mailed to the patient for review and signature.  Urine drug screen is currently up-to-date.  - Controlled Substance Treatment Agreement    4. Herpes labialis  Medications renewed  - acyclovir (ZOVIRAX) 400 MG tablet; Take 1 Tablet by mouth 3 times a day.  Dispense: 90 Tablet; Refill: 6    Chronic pain recheck for: Chronic pain from thoracic spine problems including nerve entrapment.  Last dose of controlled substance: Today  Chronic pain treated with Vicoprofen taken 2-3 times a day    She  reports no history of alcohol use.  She  reports no history of drug use.    Interval history: Patient feels she has been stable overall.  The regimen allows her to work full-time and complete her ADLs.  Any major change in health since last appointment? No    Consequences of Chronic Opiate therapy:  (5 A's)  Analgesia: Compared to no treatment or prior treatment, pain is currently improved  Activity: improved  Adverse Events: She denies constipation, itchy skin, nausea and sedation  Aberrant Behaviors: She reports she is taking medication as prescribed and is not veering from agreed treatment regimen or provider recommendations. There have been no inappropriate refills or lost/stolen meds reported.  Affect/Mood: Pain is impacting patient's  mood.  Patient denies depression/anxiety.    Nonnarcotic treatments that are being used: NSAIDs/ALANIZ-2, Gabapentin, ice and heat.     Last imagin2021    Opioid Risk Score: 1    Interpretation of Opioid Risk Score   Score 0-3 = Low risk of abuse. Do UDS at least once per year.  Score 4-7 = Moderate risk of abuse. Do UDS 1-4 times per year.  Score 8+ = High risk of abuse. Refer to specialist.    Last order of CONTROLLED SUBSTANCE TREATMENT AGREEMENT was found on 2022 from Telemedicine on 2022     UDS Summary          URINE DRUG SCREEN (Every 360 Days) Next due on 2021  Pain Management Screen    2020  Done    2017  [facility] PAIN MANAGEMENT SCREEN    2016  PAIN MANAGEMENT PANEL, SCRN W/ RFLX TO QNT              Most recent UDS reviewed today and is consistent with prescribed medications.     I have reviewed the medical records, the Prescription Monitoring Program and I have determined that controlled substance treatment is medically indicated.     Recheck in 4 months, sooner as needed.

## 2022-01-14 DIAGNOSIS — G43.709 CHRONIC MIGRAINE WITHOUT AURA WITHOUT STATUS MIGRAINOSUS, NOT INTRACTABLE: ICD-10-CM

## 2022-01-14 DIAGNOSIS — M54.14 THORACIC NEURITIS: ICD-10-CM

## 2022-01-14 DIAGNOSIS — M54.6 THORACIC SPINE PAIN: ICD-10-CM

## 2022-01-19 DIAGNOSIS — M54.14 THORACIC NEURITIS: ICD-10-CM

## 2022-01-19 DIAGNOSIS — M54.6 THORACIC SPINE PAIN: ICD-10-CM

## 2022-01-19 DIAGNOSIS — G43.709 CHRONIC MIGRAINE WITHOUT AURA WITHOUT STATUS MIGRAINOSUS, NOT INTRACTABLE: ICD-10-CM

## 2022-01-19 RX ORDER — HYDROCODONE BITARTRATE AND IBUPROFEN 7.5; 2 MG/1; MG/1
1 TABLET, FILM COATED ORAL EVERY 8 HOURS PRN
Qty: 90 TABLET | Refills: 0 | Status: SHIPPED | OUTPATIENT
Start: 2022-01-21 | End: 2022-01-27

## 2022-01-19 NOTE — PROGRESS NOTES
Patient is completing a 7-day course of Vicoprofen required by her insurance.  I have now rewritten her prescription for a 30-day course that can be filled on Friday.

## 2022-01-27 DIAGNOSIS — G43.709 CHRONIC MIGRAINE WITHOUT AURA WITHOUT STATUS MIGRAINOSUS, NOT INTRACTABLE: ICD-10-CM

## 2022-01-27 DIAGNOSIS — M54.6 THORACIC SPINE PAIN: ICD-10-CM

## 2022-01-27 DIAGNOSIS — M54.14 THORACIC NEURITIS: ICD-10-CM

## 2022-01-27 RX ORDER — HYDROCODONE BITARTRATE AND IBUPROFEN 7.5; 2 MG/1; MG/1
1 TABLET, FILM COATED ORAL EVERY 8 HOURS PRN
Qty: 90 TABLET | Refills: 0 | Status: SHIPPED | OUTPATIENT
Start: 2022-02-25 | End: 2022-03-27

## 2022-01-27 RX ORDER — HYDROCODONE BITARTRATE AND IBUPROFEN 7.5; 2 MG/1; MG/1
1 TABLET, FILM COATED ORAL EVERY 8 HOURS PRN
Qty: 90 TABLET | Refills: 0 | Status: SHIPPED | OUTPATIENT
Start: 2022-03-27 | End: 2022-05-11 | Stop reason: SDUPTHER

## 2022-02-22 DIAGNOSIS — B37.9 YEAST INFECTION: ICD-10-CM

## 2022-02-22 DIAGNOSIS — Z30.011 ORAL CONTRACEPTION INITIAL PRESCRIPTION: ICD-10-CM

## 2022-02-22 DIAGNOSIS — B00.1 HERPES LABIALIS: ICD-10-CM

## 2022-02-22 DIAGNOSIS — M54.14 THORACIC NEURITIS: ICD-10-CM

## 2022-02-22 DIAGNOSIS — K21.9 GASTROESOPHAGEAL REFLUX DISEASE, UNSPECIFIED WHETHER ESOPHAGITIS PRESENT: ICD-10-CM

## 2022-02-22 RX ORDER — ACYCLOVIR 400 MG/1
400 TABLET ORAL 3 TIMES DAILY
Qty: 90 TABLET | Refills: 6 | Status: SHIPPED | OUTPATIENT
Start: 2022-02-22 | End: 2023-04-21 | Stop reason: SDUPTHER

## 2022-02-22 RX ORDER — ACETAMINOPHEN AND CODEINE PHOSPHATE 120; 12 MG/5ML; MG/5ML
1 SOLUTION ORAL
Qty: 84 TABLET | Refills: 3 | Status: SHIPPED | OUTPATIENT
Start: 2022-02-22 | End: 2023-04-21

## 2022-02-22 RX ORDER — FLUCONAZOLE 150 MG/1
150 TABLET ORAL DAILY
Qty: 3 TABLET | Refills: 0 | Status: SHIPPED | OUTPATIENT
Start: 2022-02-22 | End: 2022-04-08

## 2022-02-22 RX ORDER — GABAPENTIN 100 MG/1
200 CAPSULE ORAL
Qty: 60 CAPSULE | Refills: 6 | Status: SHIPPED | OUTPATIENT
Start: 2022-02-22 | End: 2022-09-07

## 2022-02-22 RX ORDER — PANTOPRAZOLE SODIUM 40 MG/1
40 TABLET, DELAYED RELEASE ORAL DAILY
Qty: 30 TABLET | Refills: 6 | Status: SHIPPED | OUTPATIENT
Start: 2022-02-22 | End: 2022-09-07

## 2022-04-07 DIAGNOSIS — B37.9 YEAST INFECTION: ICD-10-CM

## 2022-04-08 RX ORDER — FLUCONAZOLE 150 MG/1
150 TABLET ORAL DAILY
Qty: 3 TABLET | Refills: 0 | Status: SHIPPED | OUTPATIENT
Start: 2022-04-08 | End: 2022-05-11

## 2022-05-11 ENCOUNTER — TELEMEDICINE (OUTPATIENT)
Dept: MEDICAL GROUP | Facility: MEDICAL CENTER | Age: 42
End: 2022-05-11

## 2022-05-11 VITALS — RESPIRATION RATE: 16 BRPM | WEIGHT: 150 LBS | HEIGHT: 64 IN | BODY MASS INDEX: 25.61 KG/M2

## 2022-05-11 DIAGNOSIS — M54.14 THORACIC NEURITIS: ICD-10-CM

## 2022-05-11 DIAGNOSIS — M54.6 THORACIC SPINE PAIN: ICD-10-CM

## 2022-05-11 DIAGNOSIS — Z79.891 CHRONIC USE OF OPIATE FOR THERAPEUTIC PURPOSE: ICD-10-CM

## 2022-05-11 DIAGNOSIS — G43.709 CHRONIC MIGRAINE WITHOUT AURA WITHOUT STATUS MIGRAINOSUS, NOT INTRACTABLE: ICD-10-CM

## 2022-05-11 PROCEDURE — 99213 OFFICE O/P EST LOW 20 MIN: CPT | Mod: 95 | Performed by: FAMILY MEDICINE

## 2022-05-11 RX ORDER — HYDROCODONE BITARTRATE AND IBUPROFEN 7.5; 2 MG/1; MG/1
1 TABLET, FILM COATED ORAL 3 TIMES DAILY
Qty: 90 TABLET | Refills: 0 | Status: SHIPPED | OUTPATIENT
Start: 2022-05-11 | End: 2022-06-10

## 2022-05-11 RX ORDER — HYDROCODONE BITARTRATE AND IBUPROFEN 7.5; 2 MG/1; MG/1
1 TABLET, FILM COATED ORAL 3 TIMES DAILY
Qty: 90 TABLET | Refills: 0 | Status: SHIPPED | OUTPATIENT
Start: 2022-06-10 | End: 2022-07-10

## 2022-05-11 RX ORDER — HYDROCODONE BITARTRATE AND IBUPROFEN 7.5; 2 MG/1; MG/1
1 TABLET, FILM COATED ORAL 3 TIMES DAILY
Qty: 90 TABLET | Refills: 0 | Status: SHIPPED | OUTPATIENT
Start: 2022-07-10 | End: 2022-09-07 | Stop reason: SDUPTHER

## 2022-05-11 ASSESSMENT — FIBROSIS 4 INDEX: FIB4 SCORE: 0.78

## 2022-05-11 NOTE — PROGRESS NOTES
Virtual Visit: Established Patient   This visit was conducted via Zoom  using secure and encrypted videoconferencing technology. The patient was in a private location in the [facility] [place].    The patient's identity was confirmed and verbal consent was obtained for this virtual visit.      CC: Chronic back pain, migraine                                                                                                                                  HPI:   Malaika presents today with the following.    1. Thoracic neuritis/Thoracic spine pain  Patient has chronic thoracic pain which actually took a long time to diagnose properly.  Patient has had very good help from physical therapy and I believe chiropractic as well.  She continues her exercises.  The Vicoprofen and gabapentin combination has been very helpful for her.  She states it brings her pain from a 7 or 8 down to 5 which allows her to work full-time and complete her ADLs.  She denies somnolence or confusion from the regimen.  No adverse events have been reported or observed.  She continues to use pantoprazole to protect her stomach from chronic daily ibuprofen.    2. Chronic migraine without aura without status migrainosus, not intractable  Patient does have migraine with intermittent headaches.  She states the Vicoprofen is helpful for this when she has those headaches.  Denies visual changes, weakness or changes in speech.    3. Chronic use of opiate for therapeutic purpose  No aberrant or addictive use of medications has been observed.  No adverse events have been reported.  Patient counseled to not add Tylenol to current regimen.  Patient counseled to keep medications locked up or under personal control.  [facility] board of pharmacy interface is reviewed.  No inconsistencies are found.  The patient's maximum MME is 22.5.  This is within CDC guideline for chronic pain prescribing by primary care.      Patient Active Problem List    Diagnosis Date Noted   •  "Vertigo 11/06/2017   • Influenza vaccine refused 10/16/2017   • Situational mixed anxiety and depressive disorder 12/13/2016   • Chronic use of opiate for therapeutic purpose 05/12/2016   • Chronic migraine without aura without status migrainosus, not intractable 10/29/2015   • Varicose veins of both legs with pain 01/02/2015   • Chronic pain syndrome 10/21/2014   • Dyslipidemia, goal LDL below 160    • Thoracic neuritis 08/13/2010   • Thoracic spine pain 05/29/2009       Current Outpatient Medications   Medication Sig Dispense Refill   • hydrocodone-ibuprofen (VICOPROFEN) 7.5-200 MG per tablet Take 1 Tablet by mouth 3 times a day for 30 days. 90 tablets is a 30 day quantity 90 Tablet 0   • [START ON 6/10/2022] hydrocodone-ibuprofen (VICOPROFEN) 7.5-200 MG per tablet Take 1 Tablet by mouth 3 times a day for 30 days. 90 tablets is a 30 day quantity 90 Tablet 0   • [START ON 7/10/2022] hydrocodone-ibuprofen (VICOPROFEN) 7.5-200 MG per tablet Take 1 Tablet by mouth 3 times a day for 30 days. 90 tablets is a 30 day quantity 90 Tablet 0   • pantoprazole (PROTONIX) 40 MG Tablet Delayed Response Take 1 Tablet by mouth every day. 30 Tablet 6   • norethindrone (FELICIA) 0.35 MG tablet Take 1 Tablet by mouth every day. 84 Tablet 3   • acyclovir (ZOVIRAX) 400 MG tablet Take 1 Tablet by mouth 3 times a day. 90 Tablet 6   • gabapentin (NEURONTIN) 100 MG Cap Take 2 Capsules by mouth at bedtime. 60 Capsule 6     No current facility-administered medications for this visit.         Allergies as of 05/11/2022 - Reviewed 05/11/2022   Allergen Reaction Noted   • Tape  03/15/2016        ROS:  Denies, chest pain, Shortness of breath, Edema.     Resp 16 Comment: observed  Ht 1.626 m (5' 4\")   Wt 68 kg (150 lb)   BMI 25.75 kg/m²       Physical Exam:  Constitutional: Alert, no distress, well-groomed.  Skin: No rashes in visible areas.  Eye: Round. Conjunctiva clear, No icterus.   ENMT: Lips pink without lesions, good dentition, moist " mucous membranes. Phonation normal.  Neck: No masses, no thyromegaly. Moves freely without pain.  Respiratory: Unlabored respiratory effort, no cough or audible wheeze  Psych: Alert and oriented x3, normal affect and mood.          Assessment and Plan.   42 y.o. female with the following issues.    1. Thoracic neuritis/Thoracic spine pain  The regimen is helpful and well-tolerated and is renewed  - hydrocodone-ibuprofen (VICOPROFEN) 7.5-200 MG per tablet; Take 1 Tablet by mouth 3 times a day for 30 days. 90 tablets is a 30 day quantity  Dispense: 90 Tablet; Refill: 0  - hydrocodone-ibuprofen (VICOPROFEN) 7.5-200 MG per tablet; Take 1 Tablet by mouth 3 times a day for 30 days. 90 tablets is a 30 day quantity  Dispense: 90 Tablet; Refill: 0  - hydrocodone-ibuprofen (VICOPROFEN) 7.5-200 MG per tablet; Take 1 Tablet by mouth 3 times a day for 30 days. 90 tablets is a 30 day quantity  Dispense: 90 Tablet; Refill: 0    2. Chronic migraine without aura without status migrainosus, not intractable  Regimen is helpful and well-tolerated and is renewed  - hydrocodone-ibuprofen (VICOPROFEN) 7.5-200 MG per tablet; Take 1 Tablet by mouth 3 times a day for 30 days. 90 tablets is a 30 day quantity  Dispense: 90 Tablet; Refill: 0  - hydrocodone-ibuprofen (VICOPROFEN) 7.5-200 MG per tablet; Take 1 Tablet by mouth 3 times a day for 30 days. 90 tablets is a 30 day quantity  Dispense: 90 Tablet; Refill: 0  - hydrocodone-ibuprofen (VICOPROFEN) 7.5-200 MG per tablet; Take 1 Tablet by mouth 3 times a day for 30 days. 90 tablets is a 30 day quantity  Dispense: 90 Tablet; Refill: 0    3. Chronic use of opiate for therapeutic purpose  Patient is overdue her urine drug screen.  The computer indicates that she is overdue for a controlled substance treatment agreement as well but we actually have that on file from August last year..  She has accomplished these in the past and urine drug screen in the past has always been consistent with her  prescribed medication.  Discussed with the patient that I need her next visit to be in person so we can obtain the urine drug screen and complete the paperwork.  She acknowledges this.  I have printed out a's consent to send to her but then once I printed it out I realized I needed the others as well so she will just complete everything when she comes in.  - Consent for Opiate Prescription    Mammogram order discussed.  She is self-pay and will be researching where she can get a mammogram at the best price.  She will then let me know and I will send an order.    Chronic pain recheck for: Chronic thoracic pain, chronic migraine  Last dose of controlled substance: Today  Chronic pain treated with hydrocodone/ibuprofen taken 2-3 times a day    She  reports no history of alcohol use.  She  reports no history of drug use.    Interval history: Patient states her medical situation has been generally stable.  Any major change in health since last appointment? No    Consequences of Chronic Opiate therapy:  (5 A's)  Analgesia: Compared to no treatment or prior treatment, pain is currently improved  Activity: improved  Adverse Events: She denies constipation, itchy skin, nausea and sedation  Aberrant Behaviors: She reports she is taking medication as prescribed and is not veering from agreed treatment regimen or provider recommendations. There have been no inappropriate refills or lost/stolen meds reported.  Affect/Mood: Pain is impacting patient's mood.  Patient denies depression/anxiety.    Nonnarcotic treatments that are being used: NSAIDs/ALANIZ-2, Gabapentin, ice and heat.     Last imaging: MRI July last year    Opioid Risk Score: 1    Interpretation of Opioid Risk Score   Score 0-3 = Low risk of abuse. Do UDS at least once per year.  Score 4-7 = Moderate risk of abuse. Do UDS 1-4 times per year.  Score 8+ = High risk of abuse. Refer to specialist.    Last order of CONTROLLED SUBSTANCE TREATMENT AGREEMENT was found on  1/11/2022 from Telemedicine on 1/11/2022     UDS Summary          Overdue - URINE DRUG SCREEN (Every 360 Days) Overdue since 4/9/2022 04/14/2021  Pain Management Screen    01/13/2020  Done    06/22/2017  [facility] PAIN MANAGEMENT SCREEN    05/12/2016  PAIN MANAGEMENT PANEL, SCRN W/ RFLX TO QNT              Most recent UDS reviewed today and is consistent with prescribed medications.     I have reviewed the medical records, the Prescription Monitoring Program and I have determined that controlled substance treatment is medically indicated.     Recheck 3 months, sooner as needed

## 2022-09-07 ENCOUNTER — OFFICE VISIT (OUTPATIENT)
Dept: MEDICAL GROUP | Facility: MEDICAL CENTER | Age: 42
End: 2022-09-07

## 2022-09-07 VITALS
WEIGHT: 156 LBS | OXYGEN SATURATION: 99 % | HEIGHT: 64 IN | DIASTOLIC BLOOD PRESSURE: 56 MMHG | BODY MASS INDEX: 26.63 KG/M2 | RESPIRATION RATE: 14 BRPM | HEART RATE: 59 BPM | SYSTOLIC BLOOD PRESSURE: 114 MMHG | TEMPERATURE: 97.4 F

## 2022-09-07 DIAGNOSIS — G43.709 CHRONIC MIGRAINE WITHOUT AURA WITHOUT STATUS MIGRAINOSUS, NOT INTRACTABLE: ICD-10-CM

## 2022-09-07 DIAGNOSIS — M54.14 THORACIC NEURITIS: ICD-10-CM

## 2022-09-07 DIAGNOSIS — M54.6 THORACIC SPINE PAIN: ICD-10-CM

## 2022-09-07 DIAGNOSIS — K21.9 GASTROESOPHAGEAL REFLUX DISEASE, UNSPECIFIED WHETHER ESOPHAGITIS PRESENT: ICD-10-CM

## 2022-09-07 DIAGNOSIS — Z79.891 CHRONIC USE OF OPIATE FOR THERAPEUTIC PURPOSE: ICD-10-CM

## 2022-09-07 PROCEDURE — 99213 OFFICE O/P EST LOW 20 MIN: CPT | Performed by: FAMILY MEDICINE

## 2022-09-07 RX ORDER — PANTOPRAZOLE SODIUM 40 MG/1
40 TABLET, DELAYED RELEASE ORAL DAILY
Qty: 90 TABLET | Refills: 3 | Status: SHIPPED | OUTPATIENT
Start: 2022-09-07 | End: 2023-10-10 | Stop reason: SDUPTHER

## 2022-09-07 RX ORDER — HYDROCODONE BITARTRATE AND IBUPROFEN 7.5; 2 MG/1; MG/1
1 TABLET, FILM COATED ORAL EVERY 6 HOURS PRN
Qty: 120 TABLET | Refills: 0 | Status: SHIPPED | OUTPATIENT
Start: 2022-10-07 | End: 2022-11-15 | Stop reason: SDUPTHER

## 2022-09-07 RX ORDER — GABAPENTIN 100 MG/1
200 CAPSULE ORAL
Qty: 180 CAPSULE | Refills: 3 | Status: SHIPPED | OUTPATIENT
Start: 2022-09-07 | End: 2023-10-10 | Stop reason: SDUPTHER

## 2022-09-07 RX ORDER — HYDROCODONE BITARTRATE AND IBUPROFEN 7.5; 2 MG/1; MG/1
1 TABLET, FILM COATED ORAL EVERY 6 HOURS PRN
Qty: 120 TABLET | Refills: 0 | Status: SHIPPED | OUTPATIENT
Start: 2022-09-07 | End: 2022-10-07

## 2022-09-07 RX ORDER — HYDROCODONE BITARTRATE AND IBUPROFEN 7.5; 2 MG/1; MG/1
1 TABLET, FILM COATED ORAL EVERY 6 HOURS PRN
Qty: 120 TABLET | Refills: 0 | Status: SHIPPED | OUTPATIENT
Start: 2022-11-06 | End: 2022-12-06

## 2022-09-07 ASSESSMENT — FIBROSIS 4 INDEX: FIB4 SCORE: 0.78

## 2022-09-07 NOTE — PROGRESS NOTES
Chief Complaint   Patient presents with    Medication Refill    Gastrophageal Reflux    Back Pain    Migraine       Subjective:     HPI:   Malaika Steiner presents today with the followin. Gastroesophageal reflux disease, unspecified whether esophagitis present  The patient feels the current medication regimen of pantoprazole is controlling the gastroesophageal reflux symptoms well. Denies dysphagia, reflux symptoms, acidity, abdominal pain or visible blood or mucus in the stool. Denies vomiting or hematemesis. Denies burping or abdominal bloating. Patient avoids nonsteroidal anti-inflammatory drugs. Avoids heavy meals or eating within 2 hours of bedtime.    2. Thoracic neuritis/Thoracic spine pain  Patient has longstanding thoracic neuritis and thoracic spine.  She has done multiple courses of physical therapy with some improvement.  The gabapentin does help some with the neuritis symptoms.  The Vicoprofen continues to be very helpful.  This is a combination of hydrocodone and ibuprofen.  Ibuprofen alone tried in the past was insufficient.  Patient denies somnolence or confusion from the regimen.  No adverse events have been reported or observed.    3. Chronic migraine without aura without status migrainosus, not intractable  Patient also uses the Vicoprofen for rescue from her chronic episodic migraines.  The gabapentin has helped that somewhat but she still does have severe headache episodes.  Denies any permanent change in vision.    4. Chronic use of opiate for therapeutic purpose  No aberrant or addictive use of medications has been observed.  No adverse events have been reported.  Patient counseled to not add Tylenol to current regimen.  Patient counseled to keep medications locked up or under personal control.  [facility] board of pharmacy interface is reviewed.  No inconsistencies are found.  The patient's maximum MME is 30.  This is within CDC guideline for chronic pain prescribing by primary  care.        Patient Active Problem List    Diagnosis Date Noted    Vertigo 11/06/2017    Influenza vaccine refused 10/16/2017    Situational mixed anxiety and depressive disorder 12/13/2016    Chronic use of opiate for therapeutic purpose 05/12/2016    Chronic migraine without aura without status migrainosus, not intractable 10/29/2015    Varicose veins of both legs with pain 01/02/2015    Chronic pain syndrome 10/21/2014    Dyslipidemia, goal LDL below 160     Thoracic neuritis 08/13/2010    Thoracic spine pain 05/29/2009       Current medicines (including changes today)  Current Outpatient Medications   Medication Sig Dispense Refill    pantoprazole (PROTONIX) 40 MG Tablet Delayed Response Take 1 Tablet by mouth every day. 90 Tablet 3    gabapentin (NEURONTIN) 100 MG Cap Take 2 Capsules by mouth at bedtime. 180 Capsule 3    hydrocodone-ibuprofen (VICOPROFEN) 7.5-200 MG per tablet Take 1 Tablet by mouth every 6 hours as needed for Moderate Pain or Severe Pain for up to 30 days. 120 tablets is a 30 day quantity 120 Tablet 0    [START ON 10/7/2022] hydrocodone-ibuprofen (VICOPROFEN) 7.5-200 MG per tablet Take 1 Tablet by mouth every 6 hours as needed for Moderate Pain or Severe Pain for up to 30 days. 120 tablets is a 30 day quantity 120 Tablet 0    [START ON 11/6/2022] hydrocodone-ibuprofen (VICOPROFEN) 7.5-200 MG per tablet Take 1 Tablet by mouth every 6 hours as needed for Moderate Pain or Severe Pain for up to 30 days. 120 tablets is a 30 day quantity 120 Tablet 0    norethindrone (FELICIA) 0.35 MG tablet Take 1 Tablet by mouth every day. 84 Tablet 3    acyclovir (ZOVIRAX) 400 MG tablet Take 1 Tablet by mouth 3 times a day. 90 Tablet 6     No current facility-administered medications for this visit.       Allergies   Allergen Reactions    Tape        ROS: As per HPI       Objective:     /56 (BP Location: Left arm, Patient Position: Sitting, BP Cuff Size: Small adult)   Pulse (!) 59   Temp 36.3 °C (97.4  "°F) (Temporal)   Resp 14   Ht 1.626 m (5' 4\")   Wt 70.8 kg (156 lb)   SpO2 99%  Body mass index is 26.78 kg/m².    Physical Exam:  Constitutional: Well-developed and well-nourished. Not diaphoretic. No distress. Lucid and fluent.  Skin: Skin is warm and dry. No rash noted.  Head: Atraumatic without lesions.  Eyes: Conjunctivae and extraocular motions are normal. Pupils are equal, round, and reactive to light. No scleral icterus.   Ears:  External ears unremarkable. Tympanic membranes clear and intact.  Nose: Nares patent. Mucosa without edema or erythema. No discharge. No facial tenderness.  Mouth/Throat: Tongue normal. Oropharynx is clear and moist. Posterior pharynx without erythema or exudates.  Neck: Supple, trachea midline. No thyromegaly present. No cervical or supraclavicular lymphadenopathy. No JVD or carotid bruits appreciated  Cardiovascular: Regular rate and rhythm.  Normal S1, S2 without murmur appreciated.  Chest: Effort normal. Clear to auscultation throughout. No adventitious sounds.   Abdomen: Soft, non tender, and without distention. Active bowel sounds in all four quadrants. No rebound, guarding, masses or hepatosplenomegaly.  Extremities: No cyanosis, clubbing, erythema, nor edema.   Neurological: Alert and oriented x 3.  No tremor appreciated.  Psychiatric:  Behavior, mood, and affect are appropriate.       Assessment and Plan:     42 y.o. female with the following issues:    1. Gastroesophageal reflux disease, unspecified whether esophagitis present  pantoprazole (PROTONIX) 40 MG Tablet Delayed Response      2. Thoracic neuritis  gabapentin (NEURONTIN) 100 MG Cap    hydrocodone-ibuprofen (VICOPROFEN) 7.5-200 MG per tablet    hydrocodone-ibuprofen (VICOPROFEN) 7.5-200 MG per tablet    hydrocodone-ibuprofen (VICOPROFEN) 7.5-200 MG per tablet      3. Thoracic spine pain  hydrocodone-ibuprofen (VICOPROFEN) 7.5-200 MG per tablet    hydrocodone-ibuprofen (VICOPROFEN) 7.5-200 MG per tablet    " hydrocodone-ibuprofen (VICOPROFEN) 7.5-200 MG per tablet      4. Chronic migraine without aura without status migrainosus, not intractable  hydrocodone-ibuprofen (VICOPROFEN) 7.5-200 MG per tablet    hydrocodone-ibuprofen (VICOPROFEN) 7.5-200 MG per tablet    hydrocodone-ibuprofen (VICOPROFEN) 7.5-200 MG per tablet      5. Chronic use of opiate for therapeutic purpose  Pain Management Screen, Urine    Consent for Opiate Prescription    Controlled Substance Treatment Agreement        Chronic pain recheck for: Chronic thoracic pain with neuritis and longstanding thoracic spinous process tenderness without inflammatory trigger found.  Last dose of controlled substance: Actually about 3 days ago  Chronic pain treated with hydrocodone APAP taken 4 times a day    She  reports no history of alcohol use.  She  reports no history of drug use.    Interval history: Patient has had increased back pain.  She has been out of her meds for few days.  Any major change in health since last appointment? No    Consequences of Chronic Opiate therapy:  (5 A's)  Analgesia: Compared to no treatment or prior treatment, pain is currently improved  Activity: improved  Adverse Events: She denies constipation, itchy skin, nausea, and sedation  Aberrant Behaviors: She reports she is taking medication as prescribed and is not veering from agreed treatment regimen or provider recommendations. There have been no inappropriate refills or lost/stolen meds reported.  Affect/Mood: Pain is impacting patient's mood.  Patient denies depression/anxiety.    Nonnarcotic treatments that are being used: NSAIDs/ALANIZ-2 and Gabapentin.     Last imagin2021 lumbar MRI    Opioid Risk Score: 1    Interpretation of Opioid Risk Score   Score 0-3 = Low risk of abuse. Do UDS at least once per year.  Score 4-7 = Moderate risk of abuse. Do UDS 1-4 times per year.  Score 8+ = High risk of abuse. Refer to specialist.    Last order of CONTROLLED SUBSTANCE TREATMENT  AGREEMENT was found on 9/7/2022 from Office Visit on 9/7/2022     UDS Summary            Ordered - URINE DRUG SCREEN (Every 360 Days) Ordered on 9/7/2022 04/14/2021  Pain Management Screen    01/13/2020  Done    06/22/2017  [facility] PAIN MANAGEMENT SCREEN    05/12/2016  PAIN MANAGEMENT PANEL, SCRN W/ RFLX TO QNT                  Most recent UDS reviewed today and is consistent with prescribed medications. UDS obtained today.    I have reviewed the medical records, the Prescription Monitoring Program and I have determined that controlled substance treatment is medically indicated.       Followup: Return in about 3 months (around 12/7/2022), or if symptoms worsen or fail to improve.

## 2022-12-09 ENCOUNTER — TELEPHONE (OUTPATIENT)
Dept: MEDICAL GROUP | Facility: MEDICAL CENTER | Age: 42
End: 2022-12-09

## 2022-12-09 NOTE — TELEPHONE ENCOUNTER
FINAL PRIOR AUTHORIZATION STATUS:    1.  Name of Medication & Dose: Pantoprazole 40MG     2. Prior Auth Status: Approved through 12/9/22-12/9/23     3. Action Taken: Pharmacy Notified: yes Patient Notified: N\A

## 2022-12-09 NOTE — TELEPHONE ENCOUNTER
DOCUMENTATION OF PAR STATUS:    1. Name of Medication & Dose: Pantoprazole 40MG     2. Name of Prescription Coverage Company & phone #: [employer]    3. Date Prior Auth Submitted: 12/9/22    4. What information was given to obtain insurance decision? ICD-10, sig, insurance    5. Prior Auth Status? Pending    6. Patient Notified: N\A

## 2022-12-13 ENCOUNTER — OFFICE VISIT (OUTPATIENT)
Dept: MEDICAL GROUP | Facility: MEDICAL CENTER | Age: 42
End: 2022-12-13

## 2022-12-13 VITALS
OXYGEN SATURATION: 97 % | DIASTOLIC BLOOD PRESSURE: 72 MMHG | BODY MASS INDEX: 25.99 KG/M2 | WEIGHT: 156 LBS | SYSTOLIC BLOOD PRESSURE: 112 MMHG | HEART RATE: 96 BPM | RESPIRATION RATE: 16 BRPM | HEIGHT: 65 IN | TEMPERATURE: 97.2 F

## 2022-12-13 DIAGNOSIS — M54.6 THORACIC SPINE PAIN: ICD-10-CM

## 2022-12-13 DIAGNOSIS — G43.709 CHRONIC MIGRAINE WITHOUT AURA WITHOUT STATUS MIGRAINOSUS, NOT INTRACTABLE: ICD-10-CM

## 2022-12-13 DIAGNOSIS — E78.5 DYSLIPIDEMIA, GOAL LDL BELOW 160: ICD-10-CM

## 2022-12-13 DIAGNOSIS — Z79.891 CHRONIC USE OF OPIATE FOR THERAPEUTIC PURPOSE: ICD-10-CM

## 2022-12-13 DIAGNOSIS — M54.14 THORACIC NEURITIS: ICD-10-CM

## 2022-12-13 PROCEDURE — 99213 OFFICE O/P EST LOW 20 MIN: CPT | Performed by: FAMILY MEDICINE

## 2022-12-13 RX ORDER — ATORVASTATIN CALCIUM 10 MG/1
10 TABLET, FILM COATED ORAL NIGHTLY
Qty: 90 TABLET | Refills: 3 | Status: SHIPPED | OUTPATIENT
Start: 2022-12-13 | End: 2023-02-07 | Stop reason: SINTOL

## 2022-12-13 RX ORDER — HYDROCODONE BITARTRATE AND IBUPROFEN 7.5; 2 MG/1; MG/1
1 TABLET, FILM COATED ORAL EVERY 6 HOURS PRN
Qty: 120 TABLET | Refills: 0 | Status: SHIPPED | OUTPATIENT
Start: 2023-02-11 | End: 2023-04-21 | Stop reason: SDUPTHER

## 2022-12-13 RX ORDER — HYDROCODONE BITARTRATE AND IBUPROFEN 7.5; 2 MG/1; MG/1
1 TABLET, FILM COATED ORAL EVERY 6 HOURS PRN
Qty: 120 TABLET | Refills: 0 | Status: SHIPPED | OUTPATIENT
Start: 2022-12-13 | End: 2023-01-12

## 2022-12-13 RX ORDER — HYDROCODONE BITARTRATE AND IBUPROFEN 7.5; 2 MG/1; MG/1
1 TABLET, FILM COATED ORAL EVERY 6 HOURS PRN
Qty: 120 TABLET | Refills: 0 | Status: SHIPPED | OUTPATIENT
Start: 2023-01-12 | End: 2023-02-11

## 2022-12-13 ASSESSMENT — FIBROSIS 4 INDEX: FIB4 SCORE: 0.78

## 2022-12-13 NOTE — PROGRESS NOTES
Chief Complaint   Patient presents with    Follow-Up     hydrocodone    Dyslipidemia       Subjective:     HPI:   Malaika Steiner presents today with the followin. Dyslipidemia, goal LDL below 160  Patient denies chest pain, chest pressure, palpitations or exertional shortness of breath. Patient is not on medication.  Discussed that this is genetic.. Patient is a never smoker. Patient takes no aspirin daily. Patient has no history of myocardial infarction, stroke or PVD.  Follow up lab orders discussed and placed.  Atorvastatin 10 mg daily begun.  Mother has high lipids etc.    2. Thoracic neuritis/Thoracic spine pain  Patient has longstanding thoracic neuritis and thoracic spine pain.  She continues her physical therapy exercises though those have never been very helpful.  She continues gabapentin 2 capsules at bedtime.  Unfortunately daytime gabapentin was too sedating.  She uses Vicoprofen 1 3-4 times a day to control the pain.  This allows her to work full-time and complete her ADLs.  PDMP review shows no inconsistencies.    3. Chronic migraine without aura without status migrainosus, not intractable  Patient does have episodic migraine.  This occurs 1-5 times per month.  She finds the Vicoprofen to be helpful for rescue.  Denies somnolence or confusion from the regimen.    4. Chronic use of opiate for therapeutic purpose  No aberrant or addictive use of medications has been observed.  No adverse events have been reported.  Patient counseled to not add Tylenol to current regimen.  Patient counseled to keep medications locked up or under personal control.  [facility] board of pharmacy interface is reviewed.  No inconsistencies are found.  The patient's maximum MME is 30.  This is within CDC guideline for chronic pain prescribing by primary care.        Patient Active Problem List    Diagnosis Date Noted    Vertigo 2017    Influenza vaccine refused 10/16/2017    Situational mixed anxiety and  "depressive disorder 12/13/2016    Chronic use of opiate for therapeutic purpose 05/12/2016    Chronic migraine without aura without status migrainosus, not intractable 10/29/2015    Varicose veins of both legs with pain 01/02/2015    Chronic pain syndrome 10/21/2014    Dyslipidemia, goal LDL below 160     Thoracic neuritis 08/13/2010    Thoracic spine pain 05/29/2009       Current medicines (including changes today)  Current Outpatient Medications   Medication Sig Dispense Refill    atorvastatin (LIPITOR) 10 MG Tab Take 1 Tablet by mouth every evening. 90 Tablet 3    hydrocodone-ibuprofen (VICOPROFEN) 7.5-200 MG per tablet Take 1 Tablet by mouth every 6 hours as needed for Moderate Pain or Severe Pain for up to 30 days. 120 tablets is a 30 day quantity 120 Tablet 0    [START ON 1/12/2023] hydrocodone-ibuprofen (VICOPROFEN) 7.5-200 MG per tablet Take 1 Tablet by mouth every 6 hours as needed for Moderate Pain or Severe Pain for up to 30 days. 120 tablets is a 30 day quantity 120 Tablet 0    [START ON 2/11/2023] hydrocodone-ibuprofen (VICOPROFEN) 7.5-200 MG per tablet Take 1 Tablet by mouth every 6 hours as needed for Moderate Pain or Severe Pain for up to 30 days. 120 tablets is a 30 day quantity 120 Tablet 0    pantoprazole (PROTONIX) 40 MG Tablet Delayed Response Take 1 Tablet by mouth every day. 90 Tablet 3    gabapentin (NEURONTIN) 100 MG Cap Take 2 Capsules by mouth at bedtime. 180 Capsule 3    norethindrone (FELICIA) 0.35 MG tablet Take 1 Tablet by mouth every day. 84 Tablet 3    acyclovir (ZOVIRAX) 400 MG tablet Take 1 Tablet by mouth 3 times a day. 90 Tablet 6     No current facility-administered medications for this visit.       Allergies   Allergen Reactions    Tape        ROS: As per HPI       Objective:     /72 (BP Location: Right arm, Patient Position: Sitting, BP Cuff Size: Adult)   Pulse 96   Temp 36.2 °C (97.2 °F) (Temporal)   Resp 16   Ht 1.651 m (5' 5\")   Wt 70.8 kg (156 lb)   SpO2 97%  " Body mass index is 25.96 kg/m².    Physical Exam:  Constitutional: Well-developed and well-nourished. Not diaphoretic. No distress. Lucid and fluent.  Patient, physician and staff all wearing masks.  Skin: Skin is warm and dry. No rash noted.  Head: Atraumatic without lesions.  Eyes: Conjunctivae and extraocular motions are normal. Pupils are equal, round, and reactive to light. No scleral icterus.   Ears:  External ears unremarkable.   Neck: Supple, trachea midline. No thyromegaly present. No cervical or supraclavicular lymphadenopathy. No JVD or carotid bruits appreciated  Cardiovascular: Regular rate and rhythm.  Normal S1, S2 without murmur appreciated.  Chest: Effort normal. Clear to auscultation throughout. No adventitious sounds.   Back: Spinous process tenderness thoracic and lumbar spine.  Extremities: No cyanosis, clubbing, erythema, nor edema.   Neurological: Alert and oriented x 3. No tremor appreciated.  Psychiatric:  Behavior, mood, and affect are appropriate.       Assessment and Plan:     42 y.o. female with the following issues:    1. Dyslipidemia, goal LDL below 160  atorvastatin (LIPITOR) 10 MG Tab    Comp Metabolic Panel    Lipid Profile      2. Thoracic neuritis  hydrocodone-ibuprofen (VICOPROFEN) 7.5-200 MG per tablet    hydrocodone-ibuprofen (VICOPROFEN) 7.5-200 MG per tablet    hydrocodone-ibuprofen (VICOPROFEN) 7.5-200 MG per tablet      3. Thoracic spine pain  hydrocodone-ibuprofen (VICOPROFEN) 7.5-200 MG per tablet    hydrocodone-ibuprofen (VICOPROFEN) 7.5-200 MG per tablet    hydrocodone-ibuprofen (VICOPROFEN) 7.5-200 MG per tablet      4. Chronic migraine without aura without status migrainosus, not intractable  hydrocodone-ibuprofen (VICOPROFEN) 7.5-200 MG per tablet    hydrocodone-ibuprofen (VICOPROFEN) 7.5-200 MG per tablet    hydrocodone-ibuprofen (VICOPROFEN) 7.5-200 MG per tablet      5. Chronic use of opiate for therapeutic purpose          Chronic pain recheck for: chronic  thoracic and lumbar pain  Last dose of controlled substance: today  Chronic pain treated with hydrocodone/apap taken 3-4 times a day    She  reports no history of alcohol use.  She  reports no history of drug use.    Interval history: fairly stable overall  Any major change in health since last appointment? No    Consequences of Chronic Opiate therapy:  (5 A's)  Analgesia: Compared to no treatment or prior treatment, pain is currently improved  Activity: improved  Adverse Events: She denies constipation, itchy skin, nausea, and sedation  Aberrant Behaviors: She reports she is taking medication as prescribed and is not veering from agreed treatment regimen or provider recommendations. There have been no inappropriate refills or lost/stolen meds reported.  Affect/Mood: Pain is impacting patient's mood.  Patient denies depression/anxiety.    Nonnarcotic treatments that are being used: NSAIDs/ALANIZ-2, Gabapentin, topical agents, ice, and heat.     Last imagin2021    Opioid Risk Score: 1    Interpretation of Opioid Risk Score   Score 0-3 = Low risk of abuse. Do UDS at least once per year.  Score 4-7 = Moderate risk of abuse. Do UDS 1-4 times per year.  Score 8+ = High risk of abuse. Refer to specialist.    Last order of CONTROLLED SUBSTANCE TREATMENT AGREEMENT was found on 2022 from Office Visit on 2022     UDS Summary            URINE DRUG SCREEN (Every 360 Days) Next due on 2022  URINE DRUG SCREEN    2021  Pain Management Screen    2020  Done    2017  [facility] PAIN MANAGEMENT SCREEN    2016  PAIN MANAGEMENT PANEL, SCRN W/ RFLX TO QNT                  Most recent UDS reviewed today and is consistent with prescribed medications.     I have reviewed the medical records, the Prescription Monitoring Program and I have determined that controlled substance treatment is medically indicated.       Followup: Return in about 3 months (around 3/13/2023), or if symptoms  worsen or fail to improve.

## 2023-04-21 ENCOUNTER — OFFICE VISIT (OUTPATIENT)
Dept: MEDICAL GROUP | Facility: MEDICAL CENTER | Age: 43
End: 2023-04-21

## 2023-04-21 VITALS
TEMPERATURE: 97.7 F | BODY MASS INDEX: 27.51 KG/M2 | HEIGHT: 65 IN | WEIGHT: 165.12 LBS | SYSTOLIC BLOOD PRESSURE: 102 MMHG | OXYGEN SATURATION: 95 % | HEART RATE: 66 BPM | RESPIRATION RATE: 14 BRPM | DIASTOLIC BLOOD PRESSURE: 60 MMHG

## 2023-04-21 DIAGNOSIS — M54.14 THORACIC NEURITIS: ICD-10-CM

## 2023-04-21 DIAGNOSIS — M54.6 THORACIC SPINE PAIN: ICD-10-CM

## 2023-04-21 DIAGNOSIS — Z79.891 CHRONIC USE OF OPIATE FOR THERAPEUTIC PURPOSE: ICD-10-CM

## 2023-04-21 DIAGNOSIS — G43.709 CHRONIC MIGRAINE WITHOUT AURA WITHOUT STATUS MIGRAINOSUS, NOT INTRACTABLE: ICD-10-CM

## 2023-04-21 DIAGNOSIS — B00.1 HERPES LABIALIS: ICD-10-CM

## 2023-04-21 PROCEDURE — 99213 OFFICE O/P EST LOW 20 MIN: CPT | Performed by: FAMILY MEDICINE

## 2023-04-21 RX ORDER — HYDROCODONE BITARTRATE AND IBUPROFEN 7.5; 2 MG/1; MG/1
1 TABLET, FILM COATED ORAL EVERY 6 HOURS PRN
Qty: 120 TABLET | Refills: 0 | Status: SHIPPED | OUTPATIENT
Start: 2023-04-21 | End: 2023-05-21

## 2023-04-21 RX ORDER — HYDROCODONE BITARTRATE AND IBUPROFEN 7.5; 2 MG/1; MG/1
1 TABLET, FILM COATED ORAL EVERY 6 HOURS PRN
Qty: 120 TABLET | Refills: 0 | Status: SHIPPED | OUTPATIENT
Start: 2023-05-21 | End: 2023-05-30 | Stop reason: SDUPTHER

## 2023-04-21 RX ORDER — ACYCLOVIR 400 MG/1
400 TABLET ORAL 3 TIMES DAILY
Qty: 90 TABLET | Refills: 6 | Status: SHIPPED | OUTPATIENT
Start: 2023-04-21 | End: 2023-10-10 | Stop reason: SDUPTHER

## 2023-04-21 RX ORDER — HYDROCODONE BITARTRATE AND IBUPROFEN 7.5; 2 MG/1; MG/1
1 TABLET, FILM COATED ORAL EVERY 6 HOURS PRN
Qty: 120 TABLET | Refills: 0 | Status: SHIPPED | OUTPATIENT
Start: 2023-06-20 | End: 2023-06-22 | Stop reason: SDUPTHER

## 2023-04-21 RX ORDER — HYDROCODONE BITARTRATE AND IBUPROFEN 7.5; 2 MG/1; MG/1
TABLET, FILM COATED ORAL
COMMUNITY
Start: 2023-03-16 | End: 2023-04-21

## 2023-04-21 ASSESSMENT — PATIENT HEALTH QUESTIONNAIRE - PHQ9: CLINICAL INTERPRETATION OF PHQ2 SCORE: 0

## 2023-04-21 NOTE — PROGRESS NOTES
Chief Complaint   Patient presents with    Medication Refill     3m fv    Arthritis    Back Pain       Subjective:     HPI:   Malaika Steiner presents today with the followin. Thoracic neuritis/Thoracic spine pain  Patient has longstanding thoracic neuritis and thoracic spine pain.  She has very tight muscles and ligaments.  She continues her exercises.  She continues her medication regimen of Vicoprofen and gabapentin.  She unfortunately has not been able to tolerate larger doses of gabapentin.  She states the Vicoprofen and gabapentin combination bring the pain from an 8 or even a 9 down to a 5 or a 6.  Sometimes it is actually better.  This allows her to work full-time and complete her ADLs.    2. Chronic migraine without aura without status migrainosus, not intractable  Patient does have chronic migraine.  The Vicoprofen is helpful for rescue.  She has tried many agents to reduce frequency and severity and has found several that she does not tolerate.  Nothing has been particularly helpful.    3. Chronic use of opiate for therapeutic purpose  No aberrant or addictive use of medications has been observed.  No adverse events have been reported.  Patient counseled to not add Tylenol to current regimen.  Patient counseled to keep medications locked up or under personal control.  [facility] board of pharmacy interface is reviewed. This is the PDMP.  No inconsistencies are found.  The patient's maximum MME is 30.  This is within CDC guideline for chronic pain prescribing by primary care.    4. Herpes labialis  Patient does have herpes labialis that does flare up intermittently.  Acyclovir is very helpful and is renewed.        Patient Active Problem List    Diagnosis Date Noted    Vertigo 2017    Influenza vaccine refused 10/16/2017    Situational mixed anxiety and depressive disorder 2016    Chronic use of opiate for therapeutic purpose 2016    Chronic migraine without aura without status  "migrainosus, not intractable 10/29/2015    Varicose veins of both legs with pain 01/02/2015    Chronic pain syndrome 10/21/2014    Dyslipidemia, goal LDL below 160     Thoracic neuritis 08/13/2010    Thoracic spine pain 05/29/2009       Current medicines (including changes today)  Current Outpatient Medications   Medication Sig Dispense Refill    hydrocodone-ibuprofen (VICOPROFEN) 7.5-200 MG per tablet Take 1 Tablet by mouth every 6 hours as needed for Moderate Pain or Severe Pain for up to 30 days. 120 tablets is a 30 day quantity 120 Tablet 0    [START ON 5/21/2023] hydrocodone-ibuprofen (VICOPROFEN) 7.5-200 MG per tablet Take 1 Tablet by mouth every 6 hours as needed for Moderate Pain or Severe Pain for up to 30 days. 120 tablets is a 30 day quantity 120 Tablet 0    [START ON 6/20/2023] hydrocodone-ibuprofen (VICOPROFEN) 7.5-200 MG per tablet Take 1 Tablet by mouth every 6 hours as needed for Moderate Pain or Severe Pain for up to 30 days. 120 tablets is a 30 day quantity 120 Tablet 0    acyclovir (ZOVIRAX) 400 MG tablet Take 1 Tablet by mouth 3 times a day. 90 Tablet 6    pantoprazole (PROTONIX) 40 MG Tablet Delayed Response Take 1 Tablet by mouth every day. 90 Tablet 3    gabapentin (NEURONTIN) 100 MG Cap Take 2 Capsules by mouth at bedtime. 180 Capsule 3    norethindrone (FELICIA) 0.35 MG tablet Take 1 Tablet by mouth every day. 84 Tablet 3     No current facility-administered medications for this visit.       Allergies   Allergen Reactions    Tape        ROS: As per HPI       Objective:     /60 (BP Location: Right arm, Patient Position: Sitting, BP Cuff Size: Small adult)   Pulse 66   Temp 36.5 °C (97.7 °F) (Temporal)   Resp 14   Ht 1.651 m (5' 5\")   Wt 74.9 kg (165 lb 2 oz)   SpO2 95%  Body mass index is 27.48 kg/m².    Physical Exam:  Constitutional: Well-developed and well-nourished. Not diaphoretic. No distress. Lucid and fluent.  Skin: Skin is warm and dry. No rash noted.  Head: Atraumatic " without lesions.  Eyes: Conjunctivae and extraocular motions are normal. Pupils are equal, round, and reactive to light. No scleral icterus.   Ears:  External ears unremarkable.   Mouth/Throat: Tongue normal. Oropharynx is clear and moist. Posterior pharynx without erythema or exudates.  Neck: Supple, trachea midline. No thyromegaly present. No cervical or supraclavicular lymphadenopathy. No JVD or carotid bruits appreciated  Cardiovascular: Regular rate and rhythm.  Normal S1, S2 without murmur appreciated.  Chest: Effort normal. Clear to auscultation throughout. No adventitious sounds.   Abdomen: Soft, non tender, and without distention. Active bowel sounds in all four quadrants. No rebound, guarding, masses or hepatosplenomegaly.  Extremities: No cyanosis, clubbing, erythema, nor edema.   Neurological: Alert and oriented x 3.  No tremor noted.  Movements symmetric.  Psychiatric:  Behavior, mood, and affect are appropriate.       Assessment and Plan:     43 y.o. female with the following issues:    1. Thoracic neuritis  hydrocodone-ibuprofen (VICOPROFEN) 7.5-200 MG per tablet    hydrocodone-ibuprofen (VICOPROFEN) 7.5-200 MG per tablet    hydrocodone-ibuprofen (VICOPROFEN) 7.5-200 MG per tablet      2. Thoracic spine pain  hydrocodone-ibuprofen (VICOPROFEN) 7.5-200 MG per tablet    hydrocodone-ibuprofen (VICOPROFEN) 7.5-200 MG per tablet    hydrocodone-ibuprofen (VICOPROFEN) 7.5-200 MG per tablet      3. Chronic migraine without aura without status migrainosus, not intractable  hydrocodone-ibuprofen (VICOPROFEN) 7.5-200 MG per tablet    hydrocodone-ibuprofen (VICOPROFEN) 7.5-200 MG per tablet    hydrocodone-ibuprofen (VICOPROFEN) 7.5-200 MG per tablet      4. Chronic use of opiate for therapeutic purpose        5. Herpes labialis  acyclovir (ZOVIRAX) 400 MG tablet        Chronic pain recheck for: chronic thoracic and lumbar pain, migraine  Last dose of controlled substance: today  Chronic pain treated with  hydrocodone/asa taken 3-4 times a day    She  reports no history of alcohol use.  She  reports no history of drug use.    Interval history: Patient has been stable overall  Any major change in health since last appointment? No    Consequences of Chronic Opiate therapy:  (5 A's)  Analgesia: Compared to no treatment or prior treatment, pain is currently improved  Activity: improved  Adverse Events: She denies constipation, itchy skin, nausea, and sedation  Aberrant Behaviors: She reports she is taking medication as prescribed and is not veering from agreed treatment regimen or provider recommendations. There have been no inappropriate refills or lost/stolen meds reported.  Affect/Mood: Pain is impacting patient's mood.  Patient denies depression/anxiety.    Nonnarcotic treatments that are being used: NSAIDs/ALANIZ-2 and Gabapentin.     Last imagin2021    Opioid Risk Score: 1    Interpretation of Opioid Risk Score   Score 0-3 = Low risk of abuse. Do UDS at least once per year.  Score 4-7 = Moderate risk of abuse. Do UDS 1-4 times per year.  Score 8+ = High risk of abuse. Refer to specialist.    Last order of CONTROLLED SUBSTANCE TREATMENT AGREEMENT was found on 2022 from Office Visit on 2022     UDS Summary            URINE DRUG SCREEN (Every 360 Days) Next due on 2022  URINE DRUG SCREEN    2021  Pain Management Screen    2020  Done    2017  [facility] PAIN MANAGEMENT SCREEN    2016  PAIN MANAGEMENT PANEL, SCRN W/ RFLX TO QNT                  Most recent UDS reviewed today and is consistent with prescribed medications.     I have reviewed the medical records, the Prescription Monitoring Program and I have determined that controlled substance treatment is medically indicated.       Followup: Return in about 3 months (around 2023), or if symptoms worsen or fail to improve.

## 2023-05-30 ENCOUNTER — TELEPHONE (OUTPATIENT)
Dept: MEDICAL GROUP | Facility: MEDICAL CENTER | Age: 43
End: 2023-05-30

## 2023-05-30 DIAGNOSIS — M54.14 THORACIC NEURITIS: ICD-10-CM

## 2023-05-30 DIAGNOSIS — M54.6 THORACIC SPINE PAIN: ICD-10-CM

## 2023-05-30 DIAGNOSIS — G43.709 CHRONIC MIGRAINE WITHOUT AURA WITHOUT STATUS MIGRAINOSUS, NOT INTRACTABLE: ICD-10-CM

## 2023-05-30 RX ORDER — HYDROCODONE BITARTRATE AND IBUPROFEN 7.5; 2 MG/1; MG/1
1 TABLET, FILM COATED ORAL EVERY 6 HOURS PRN
Qty: 120 TABLET | Refills: 0 | Status: SHIPPED | OUTPATIENT
Start: 2023-05-30 | End: 2023-06-29

## 2023-05-30 NOTE — TELEPHONE ENCOUNTER
Pharmacy sent fax requesting an alternative to Hydrocodone. Pharmacy is currently backordered. Please advise.

## 2023-05-31 NOTE — PROGRESS NOTES
[facility] is out of the Vicoprofen.  This seems odd.  I am sending the prescription to [facility] instead.  PDMP review shows no inconsistencies.

## 2023-05-31 NOTE — TELEPHONE ENCOUNTER
I actually sent the patient a [employer] message and sent the prescription to [facility].  Hopefully they are able to fill that for her.

## 2023-06-03 ENCOUNTER — TELEPHONE (OUTPATIENT)
Dept: MEDICAL GROUP | Facility: MEDICAL CENTER | Age: 43
End: 2023-06-03

## 2023-06-03 NOTE — LETTER
Stephany 3, 2023        Patient: Malaika Steinre   YOB: 1980   Date of Visit: 6/3/2023           To Whom It May Concern:    It is my medical opinion that Malaika Steiner has chronic arthritis of the spine.  Aquatics classes are likely to be medically helpful for this patient.    If you have any questions or concerns, please don't hesitate to call.    Sincerely,      Ruel Kaufman M.D.  Electronically Signed    [place] [id number] [address]   [place] [address] [id number] [phone] (Phone)  [phone] (Fax)

## 2023-06-03 NOTE — TELEPHONE ENCOUNTER
Letter written regarding suitability of aquatic classes for her as she has thoracic spine arthritis.

## 2023-06-22 DIAGNOSIS — M54.6 THORACIC SPINE PAIN: ICD-10-CM

## 2023-06-22 DIAGNOSIS — M54.14 THORACIC NEURITIS: ICD-10-CM

## 2023-06-22 DIAGNOSIS — G43.709 CHRONIC MIGRAINE WITHOUT AURA WITHOUT STATUS MIGRAINOSUS, NOT INTRACTABLE: ICD-10-CM

## 2023-06-22 RX ORDER — HYDROCODONE BITARTRATE AND IBUPROFEN 7.5; 2 MG/1; MG/1
1 TABLET, FILM COATED ORAL EVERY 6 HOURS PRN
Qty: 120 TABLET | Refills: 0 | Status: SHIPPED | OUTPATIENT
Start: 2023-06-30 | End: 2023-06-27 | Stop reason: SDUPTHER

## 2023-06-22 RX ORDER — HYDROCODONE BITARTRATE AND IBUPROFEN 7.5; 2 MG/1; MG/1
1 TABLET, FILM COATED ORAL EVERY 6 HOURS PRN
Qty: 120 TABLET | Refills: 0 | OUTPATIENT
Start: 2023-06-22 | End: 2023-07-22

## 2023-06-27 DIAGNOSIS — M54.14 THORACIC NEURITIS: ICD-10-CM

## 2023-06-27 DIAGNOSIS — M54.6 THORACIC SPINE PAIN: ICD-10-CM

## 2023-06-27 DIAGNOSIS — G43.709 CHRONIC MIGRAINE WITHOUT AURA WITHOUT STATUS MIGRAINOSUS, NOT INTRACTABLE: ICD-10-CM

## 2023-06-27 RX ORDER — HYDROCODONE BITARTRATE AND IBUPROFEN 7.5; 2 MG/1; MG/1
1 TABLET, FILM COATED ORAL EVERY 6 HOURS PRN
Qty: 120 TABLET | Refills: 0 | Status: SHIPPED | OUTPATIENT
Start: 2023-06-27 | End: 2023-07-27

## 2023-06-28 ENCOUNTER — TELEPHONE (OUTPATIENT)
Dept: MEDICAL GROUP | Facility: MEDICAL CENTER | Age: 43
End: 2023-06-28

## 2023-06-28 NOTE — TELEPHONE ENCOUNTER
DOCUMENTATION OF PAR STATUS:    1. Name of Medication & Dose: HYDROcodone-Ibuprofen 7.5-200MG tablets      2. Name of Prescription Coverage Company & phone #: [employer]    3. Date Prior Auth Submitted: 6/28/23    4. What information was given to obtain insurance decision? ICD-10, sig, insurance    5. Prior Auth Status? Pending    6. Patient Notified: N\A

## 2023-06-29 NOTE — TELEPHONE ENCOUNTER
FINAL PRIOR AUTHORIZATION STATUS:    1.  Name of Medication & Dose:   HYDROcodone-Ibuprofen 7.5-200MG tablets      2. Prior Auth Status: Approved through 12/25/23     3. Action Taken: Pharmacy Notified: yes Patient Notified: N\A

## 2023-10-10 DIAGNOSIS — M54.14 THORACIC NEURITIS: ICD-10-CM

## 2023-10-10 DIAGNOSIS — B00.1 HERPES LABIALIS: ICD-10-CM

## 2023-10-10 DIAGNOSIS — K21.9 GASTROESOPHAGEAL REFLUX DISEASE, UNSPECIFIED WHETHER ESOPHAGITIS PRESENT: ICD-10-CM

## 2023-10-11 ENCOUNTER — TELEPHONE (OUTPATIENT)
Dept: MEDICAL GROUP | Facility: MEDICAL CENTER | Age: 43
End: 2023-10-11

## 2023-10-11 RX ORDER — ACYCLOVIR 400 MG/1
400 TABLET ORAL 3 TIMES DAILY
Qty: 90 TABLET | Refills: 6 | Status: SHIPPED | OUTPATIENT
Start: 2023-10-11

## 2023-10-11 RX ORDER — PANTOPRAZOLE SODIUM 40 MG/1
40 TABLET, DELAYED RELEASE ORAL DAILY
Qty: 90 TABLET | Refills: 3 | Status: SHIPPED | OUTPATIENT
Start: 2023-10-11

## 2023-10-11 RX ORDER — GABAPENTIN 100 MG/1
200 CAPSULE ORAL
Qty: 180 CAPSULE | Refills: 3 | Status: SHIPPED | OUTPATIENT
Start: 2023-10-11

## 2023-11-13 DIAGNOSIS — G56.03 CARPAL TUNNEL SYNDROME, BILATERAL: ICD-10-CM

## 2023-11-20 ENCOUNTER — TELEMEDICINE (OUTPATIENT)
Dept: MEDICAL GROUP | Facility: MEDICAL CENTER | Age: 43
End: 2023-11-20

## 2023-11-20 VITALS — RESPIRATION RATE: 14 BRPM | WEIGHT: 155 LBS | HEIGHT: 64 IN | BODY MASS INDEX: 26.46 KG/M2

## 2023-11-20 DIAGNOSIS — G89.4 CHRONIC PAIN SYNDROME: ICD-10-CM

## 2023-11-20 DIAGNOSIS — M65.331 TRIGGER MIDDLE FINGER OF RIGHT HAND: ICD-10-CM

## 2023-11-20 DIAGNOSIS — M54.6 THORACIC SPINE PAIN: ICD-10-CM

## 2023-11-20 PROCEDURE — 99213 OFFICE O/P EST LOW 20 MIN: CPT | Mod: 95 | Performed by: FAMILY MEDICINE

## 2023-11-20 RX ORDER — COVID-19 ANTIGEN TEST
1 KIT MISCELLANEOUS 2 TIMES DAILY
Qty: 60 CAPSULE | Refills: 6 | COMMUNITY
Start: 2023-11-20

## 2023-11-20 NOTE — PROGRESS NOTES
Virtual Visit: Established Patient   This visit was conducted via Zoom  using secure and encrypted videoconferencing technology. The patient was in a private location in the state of [place].    The patient's identity was confirmed and verbal consent was obtained for this virtual visit.      CC:thoracic spine pain,                                                                                                                                       HPI:   Malaika presents today with the following.    1. Thoracic spine pain/Chronic pain syndrome  She has chronic back pain.  Has stopped the vicoprofen. Her new bed is helping a little.  Using stretching, chiropractic, etc.  Burning in her hands interrupts her sleep.  Really interferes.    2. Trigger middle finger of right hand  Will be seeing orthopedist about her carpal tunnel.  Will ask her to address the trigger finger also.  Significant pain from the finger.        Patient Active Problem List    Diagnosis Date Noted    Vertigo 11/06/2017    Influenza vaccine refused 10/16/2017    Situational mixed anxiety and depressive disorder 12/13/2016    Chronic migraine without aura without status migrainosus, not intractable 10/29/2015    Varicose veins of both legs with pain 01/02/2015    Chronic pain syndrome 10/21/2014    Dyslipidemia, goal LDL below 160     Thoracic neuritis 08/13/2010    Thoracic spine pain 05/29/2009       Current Outpatient Medications   Medication Sig Dispense Refill    Naproxen Sodium 220 MG Cap Take 1 Capsule by mouth 2 times a day. 60 Capsule 6    pantoprazole (PROTONIX) 40 MG Tablet Delayed Response Take 1 Tablet by mouth every day. 90 Tablet 3    gabapentin (NEURONTIN) 100 MG Cap Take 2 Capsules by mouth at bedtime. 180 Capsule 3    acyclovir (ZOVIRAX) 400 MG tablet Take 1 Tablet by mouth 3 times a day. 90 Tablet 6     No current facility-administered medications for this visit.         Allergies as of 11/20/2023 - Reviewed 11/20/2023   Allergen  "Reaction Noted    Tape  03/15/2016        ROS:  Denies chest pain, Shortness of breath, Edema.     Resp 14 Comment: observed  Ht 1.626 m (5' 4\")   Wt 70.3 kg (155 lb)   BMI 26.61 kg/m²       Physical Exam:  Constitutional: Alert, no distress, well-groomed.  Skin: No rashes in visible areas.  Eye: Round. Conjunctiva clear, No icterus.   ENMT: Lips pink without lesions, good dentition, moist mucous membranes. Phonation normal.  Neck: No masses, no thyromegaly. Moves freely without pain.  Respiratory: Unlabored respiratory effort, no cough or audible wheeze  Psych: Alert and oriented x3, normal affect and mood.      Per patient recent testosterone test zero.  Will be starting low dose.    Assessment and Plan.   43 y.o. female with the following issues.    1. Thoracic spine pain/Chronic pain syndrome  She will continue to take with food and water.    - Naproxen Sodium 220 MG Cap; Take 1 Capsule by mouth 2 times a day.  Dispense: 60 Capsule; Refill: 6    2. Trigger middle finger of right hand  Pain from finger, referral placed  - Referral to Orthopedics    Recheck six months, sooner as needed      "

## 2023-12-07 DIAGNOSIS — E34.9 HORMONE DISORDER: ICD-10-CM

## 2024-02-03 LAB
ALBUMIN SERPL-MCNC: 4.2 G/DL (ref 3.9–4.9)
ALP SERPL-CCNC: 55 IU/L (ref 44–121)
ALT SERPL-CCNC: 11 IU/L (ref 0–32)
AST SERPL-CCNC: 15 IU/L (ref 0–40)
BILIRUB DIRECT SERPL-MCNC: <0.1 MG/DL (ref 0–0.4)
BILIRUB SERPL-MCNC: 0.3 MG/DL (ref 0–1.2)
ERYTHROCYTE [DISTWIDTH] IN BLOOD BY AUTOMATED COUNT: 13 % (ref 11.7–15.4)
HCT VFR BLD AUTO: 41.3 % (ref 34–46.6)
HGB BLD-MCNC: 13.5 G/DL (ref 11.1–15.9)
MCH RBC QN AUTO: 30.6 PG (ref 26.6–33)
MCHC RBC AUTO-ENTMCNC: 32.7 G/DL (ref 31.5–35.7)
MCV RBC AUTO: 94 FL (ref 79–97)
NRBC BLD AUTO-RTO: NORMAL %
PLATELET # BLD AUTO: 261 X10E3/UL (ref 150–450)
PROT SERPL-MCNC: 6.6 G/DL (ref 6–8.5)
RBC # BLD AUTO: 4.41 X10E6/UL (ref 3.77–5.28)
TESTOST SERPL-MCNC: 24 NG/DL
WBC # BLD AUTO: 6.5 X10E3/UL (ref 3.4–10.8)

## 2024-02-06 DIAGNOSIS — E66.3 OVERWEIGHT (BMI 25.0-29.9): ICD-10-CM

## 2024-02-06 DIAGNOSIS — R42 VERTIGO: ICD-10-CM

## 2024-02-06 DIAGNOSIS — E78.5 DYSLIPIDEMIA, GOAL LDL BELOW 160: ICD-10-CM

## 2024-09-10 DIAGNOSIS — E34.9 HORMONE DISORDER: ICD-10-CM

## 2024-09-10 DIAGNOSIS — N95.1 PERIMENOPAUSAL SYMPTOMS: ICD-10-CM

## 2024-10-03 DIAGNOSIS — Z12.4 CERVICAL CANCER SCREENING: ICD-10-CM
